# Patient Record
Sex: FEMALE | NOT HISPANIC OR LATINO | Employment: UNEMPLOYED | URBAN - NONMETROPOLITAN AREA
[De-identification: names, ages, dates, MRNs, and addresses within clinical notes are randomized per-mention and may not be internally consistent; named-entity substitution may affect disease eponyms.]

---

## 2023-03-15 LAB
APPEARANCE, URINE: CLEAR
BACTERIA, URINE: ABNORMAL /HPF
BILIRUBIN, URINE: NEGATIVE
BLOOD, URINE: ABNORMAL
COLOR, URINE: YELLOW
GLUCOSE, URINE: NEGATIVE MG/DL
KETONES, URINE: NEGATIVE MG/DL
LEUKOCYTE ESTERASE, URINE: NEGATIVE
NITRITE, URINE: NEGATIVE
PH, URINE: 6 (ref 5–8)
PROTEIN, URINE: NEGATIVE MG/DL
RBC, URINE: 2 /HPF (ref 0–5)
SPECIFIC GRAVITY, URINE: 1.01 (ref 1–1.03)
SQUAMOUS EPITHELIAL CELLS, URINE: 1 /HPF
UROBILINOGEN, URINE: <2 MG/DL (ref 0–1.9)
WBC, URINE: 1 /HPF (ref 0–5)

## 2023-03-17 LAB
APPEARANCE, URINE: CLEAR
BACTERIA, URINE: ABNORMAL /HPF
BILIRUBIN, URINE: NEGATIVE
BLOOD, URINE: ABNORMAL
COLOR, URINE: YELLOW
GLUCOSE, URINE: NEGATIVE MG/DL
KETONES, URINE: NEGATIVE MG/DL
LEUKOCYTE ESTERASE, URINE: ABNORMAL
MUCUS, URINE: ABNORMAL /LPF
NITRITE, URINE: NEGATIVE
PH, URINE: 6 (ref 5–8)
PROTEIN, URINE: NEGATIVE MG/DL
RBC, URINE: 2 /HPF (ref 0–5)
SPECIFIC GRAVITY, URINE: 1.01 (ref 1–1.03)
SQUAMOUS EPITHELIAL CELLS, URINE: 7 /HPF
URINE CULTURE: ABNORMAL
UROBILINOGEN, URINE: <2 MG/DL (ref 0–1.9)
WBC, URINE: 5 /HPF (ref 0–5)

## 2023-03-21 LAB — URINE CULTURE: ABNORMAL

## 2023-06-10 LAB
CLUE CELLS: NORMAL
NUGENT SCORE: 0
YEAST: NORMAL

## 2024-03-26 ENCOUNTER — LAB REQUISITION (OUTPATIENT)
Dept: LAB | Facility: HOSPITAL | Age: 34
End: 2024-03-26
Payer: MEDICAID

## 2024-03-26 LAB
ALBUMIN SERPL BCP-MCNC: 4.7 G/DL (ref 3.4–5)
ALP SERPL-CCNC: 70 U/L (ref 33–110)
ALT SERPL W P-5'-P-CCNC: 19 U/L (ref 7–45)
ANION GAP SERPL CALC-SCNC: 14 MMOL/L (ref 10–20)
AST SERPL W P-5'-P-CCNC: 14 U/L (ref 9–39)
BILIRUB SERPL-MCNC: 0.3 MG/DL (ref 0–1.2)
BUN SERPL-MCNC: 13 MG/DL (ref 6–23)
CALCIUM SERPL-MCNC: 10.1 MG/DL (ref 8.6–10.3)
CHLORIDE SERPL-SCNC: 106 MMOL/L (ref 98–107)
CO2 SERPL-SCNC: 26 MMOL/L (ref 21–32)
CREAT SERPL-MCNC: 0.81 MG/DL (ref 0.5–1.05)
EGFRCR SERPLBLD CKD-EPI 2021: >90 ML/MIN/1.73M*2
ERYTHROCYTE [DISTWIDTH] IN BLOOD BY AUTOMATED COUNT: 13.9 % (ref 11.5–14.5)
GLUCOSE SERPL-MCNC: 93 MG/DL (ref 74–99)
HCT VFR BLD AUTO: 40.8 % (ref 36–46)
HGB BLD-MCNC: 13 G/DL (ref 12–16)
LAMOTRIGINE SERPL-MCNC: 4.9 UG/ML (ref 2.5–15)
LITHIUM SERPL-SCNC: 0.56 MMOL/L (ref 0.6–1.2)
MCH RBC QN AUTO: 30.3 PG (ref 26–34)
MCHC RBC AUTO-ENTMCNC: 31.9 G/DL (ref 32–36)
MCV RBC AUTO: 95 FL (ref 80–100)
NRBC BLD-RTO: 0 /100 WBCS (ref 0–0)
PLATELET # BLD AUTO: 397 X10*3/UL (ref 150–450)
POTASSIUM SERPL-SCNC: 4 MMOL/L (ref 3.5–5.3)
PROT SERPL-MCNC: 7.5 G/DL (ref 6.4–8.2)
RBC # BLD AUTO: 4.29 X10*6/UL (ref 4–5.2)
SODIUM SERPL-SCNC: 142 MMOL/L (ref 136–145)
TSH SERPL-ACNC: 3.41 MIU/L (ref 0.44–3.98)
WBC # BLD AUTO: 10.9 X10*3/UL (ref 4.4–11.3)

## 2024-03-26 PROCEDURE — 80178 ASSAY OF LITHIUM: CPT | Mod: OUT,GENLAB | Performed by: PHYSICIAN ASSISTANT

## 2024-03-26 PROCEDURE — 84443 ASSAY THYROID STIM HORMONE: CPT | Mod: OUT | Performed by: PHYSICIAN ASSISTANT

## 2024-03-26 PROCEDURE — 80175 DRUG SCREEN QUAN LAMOTRIGINE: CPT | Mod: OUT,GENLAB | Performed by: PHYSICIAN ASSISTANT

## 2024-03-26 PROCEDURE — 80053 COMPREHEN METABOLIC PANEL: CPT | Mod: OUT | Performed by: PHYSICIAN ASSISTANT

## 2024-03-26 PROCEDURE — 85027 COMPLETE CBC AUTOMATED: CPT | Mod: OUT | Performed by: PHYSICIAN ASSISTANT

## 2024-06-04 ENCOUNTER — LAB REQUISITION (OUTPATIENT)
Dept: LAB | Facility: HOSPITAL | Age: 34
End: 2024-06-04
Payer: MEDICAID

## 2024-06-04 DIAGNOSIS — R41.82 ALTERED MENTAL STATUS, UNSPECIFIED: ICD-10-CM

## 2024-06-04 LAB — LITHIUM SERPL-SCNC: 0.51 MMOL/L (ref 0.6–1.2)

## 2024-06-04 PROCEDURE — 80178 ASSAY OF LITHIUM: CPT | Mod: OUT,GENLAB | Performed by: PHYSICIAN ASSISTANT

## 2024-07-03 ENCOUNTER — APPOINTMENT (OUTPATIENT)
Dept: CARDIOLOGY | Facility: HOSPITAL | Age: 34
End: 2024-07-03
Payer: MEDICAID

## 2024-07-03 ENCOUNTER — HOSPITAL ENCOUNTER (EMERGENCY)
Facility: HOSPITAL | Age: 34
Discharge: OTHER INSTITUTION | End: 2024-07-04
Attending: EMERGENCY MEDICINE
Payer: MEDICAID

## 2024-07-03 DIAGNOSIS — R45.851 SUICIDAL IDEATION: Primary | ICD-10-CM

## 2024-07-03 LAB
ALBUMIN SERPL BCP-MCNC: 4.8 G/DL (ref 3.4–5)
ALP SERPL-CCNC: 83 U/L (ref 33–110)
ALT SERPL W P-5'-P-CCNC: 25 U/L (ref 7–45)
AMPHETAMINES UR QL SCN: NORMAL
ANION GAP SERPL CALC-SCNC: 13 MMOL/L (ref 10–20)
APAP SERPL-MCNC: <10 UG/ML
AST SERPL W P-5'-P-CCNC: 18 U/L (ref 9–39)
BARBITURATES UR QL SCN: NORMAL
BASOPHILS # BLD AUTO: 0.08 X10*3/UL (ref 0–0.1)
BASOPHILS NFR BLD AUTO: 0.6 %
BENZODIAZ UR QL SCN: NORMAL
BILIRUB SERPL-MCNC: 0.4 MG/DL (ref 0–1.2)
BUN SERPL-MCNC: 13 MG/DL (ref 6–23)
BZE UR QL SCN: NORMAL
CALCIUM SERPL-MCNC: 10.4 MG/DL (ref 8.6–10.3)
CANNABINOIDS UR QL SCN: NORMAL
CHLORIDE SERPL-SCNC: 103 MMOL/L (ref 98–107)
CO2 SERPL-SCNC: 27 MMOL/L (ref 21–32)
CREAT SERPL-MCNC: 0.9 MG/DL (ref 0.5–1.05)
EGFRCR SERPLBLD CKD-EPI 2021: 87 ML/MIN/1.73M*2
EOSINOPHIL # BLD AUTO: 0.15 X10*3/UL (ref 0–0.7)
EOSINOPHIL NFR BLD AUTO: 1.2 %
ERYTHROCYTE [DISTWIDTH] IN BLOOD BY AUTOMATED COUNT: 12.7 % (ref 11.5–14.5)
ETHANOL SERPL-MCNC: <10 MG/DL
FENTANYL+NORFENTANYL UR QL SCN: NORMAL
GLUCOSE SERPL-MCNC: 95 MG/DL (ref 74–99)
HCG UR QL IA.RAPID: NEGATIVE
HCT VFR BLD AUTO: 39.4 % (ref 36–46)
HGB BLD-MCNC: 13.2 G/DL (ref 12–16)
IMM GRANULOCYTES # BLD AUTO: 0.05 X10*3/UL (ref 0–0.7)
IMM GRANULOCYTES NFR BLD AUTO: 0.4 % (ref 0–0.9)
LYMPHOCYTES # BLD AUTO: 3.26 X10*3/UL (ref 1.2–4.8)
LYMPHOCYTES NFR BLD AUTO: 25.8 %
MCH RBC QN AUTO: 30.9 PG (ref 26–34)
MCHC RBC AUTO-ENTMCNC: 33.5 G/DL (ref 32–36)
MCV RBC AUTO: 92 FL (ref 80–100)
METHADONE UR QL SCN: NORMAL
MONOCYTES # BLD AUTO: 0.74 X10*3/UL (ref 0.1–1)
MONOCYTES NFR BLD AUTO: 5.9 %
NEUTROPHILS # BLD AUTO: 8.36 X10*3/UL (ref 1.2–7.7)
NEUTROPHILS NFR BLD AUTO: 66.1 %
NRBC BLD-RTO: 0 /100 WBCS (ref 0–0)
OPIATES UR QL SCN: NORMAL
OXYCODONE+OXYMORPHONE UR QL SCN: NORMAL
PCP UR QL SCN: NORMAL
PLATELET # BLD AUTO: 369 X10*3/UL (ref 150–450)
POTASSIUM SERPL-SCNC: 3.9 MMOL/L (ref 3.5–5.3)
PROT SERPL-MCNC: 8 G/DL (ref 6.4–8.2)
RBC # BLD AUTO: 4.27 X10*6/UL (ref 4–5.2)
SALICYLATES SERPL-MCNC: <3 MG/DL
SARS-COV-2 RNA RESP QL NAA+PROBE: NOT DETECTED
SODIUM SERPL-SCNC: 139 MMOL/L (ref 136–145)
WBC # BLD AUTO: 12.6 X10*3/UL (ref 4.4–11.3)

## 2024-07-03 PROCEDURE — 2500000005 HC RX 250 GENERAL PHARMACY W/O HCPCS: Mod: SE

## 2024-07-03 PROCEDURE — 93005 ELECTROCARDIOGRAM TRACING: CPT

## 2024-07-03 PROCEDURE — 2500000001 HC RX 250 WO HCPCS SELF ADMINISTERED DRUGS (ALT 637 FOR MEDICARE OP): Mod: SE | Performed by: EMERGENCY MEDICINE

## 2024-07-03 PROCEDURE — 81001 URINALYSIS AUTO W/SCOPE: CPT | Performed by: EMERGENCY MEDICINE

## 2024-07-03 PROCEDURE — 85025 COMPLETE CBC W/AUTO DIFF WBC: CPT | Performed by: EMERGENCY MEDICINE

## 2024-07-03 PROCEDURE — 80307 DRUG TEST PRSMV CHEM ANLYZR: CPT | Performed by: EMERGENCY MEDICINE

## 2024-07-03 PROCEDURE — 99285 EMERGENCY DEPT VISIT HI MDM: CPT

## 2024-07-03 PROCEDURE — 80143 DRUG ASSAY ACETAMINOPHEN: CPT | Performed by: EMERGENCY MEDICINE

## 2024-07-03 PROCEDURE — 36415 COLL VENOUS BLD VENIPUNCTURE: CPT | Performed by: EMERGENCY MEDICINE

## 2024-07-03 PROCEDURE — 81025 URINE PREGNANCY TEST: CPT | Performed by: EMERGENCY MEDICINE

## 2024-07-03 PROCEDURE — 2500000002 HC RX 250 W HCPCS SELF ADMINISTERED DRUGS (ALT 637 FOR MEDICARE OP, ALT 636 FOR OP/ED): Mod: SE | Performed by: EMERGENCY MEDICINE

## 2024-07-03 PROCEDURE — 80053 COMPREHEN METABOLIC PANEL: CPT | Performed by: EMERGENCY MEDICINE

## 2024-07-03 PROCEDURE — 87635 SARS-COV-2 COVID-19 AMP PRB: CPT | Performed by: EMERGENCY MEDICINE

## 2024-07-03 RX ORDER — ONDANSETRON HYDROCHLORIDE 2 MG/ML
4 INJECTION, SOLUTION INTRAVENOUS ONCE
Status: DISCONTINUED | OUTPATIENT
Start: 2024-07-03 | End: 2024-07-04 | Stop reason: HOSPADM

## 2024-07-03 RX ORDER — LEVOTHYROXINE SODIUM 75 UG/1
1 TABLET ORAL DAILY
COMMUNITY

## 2024-07-03 RX ORDER — LORATADINE 10 MG/1
10 TABLET ORAL AS NEEDED
COMMUNITY
End: 2024-07-09 | Stop reason: HOSPADM

## 2024-07-03 RX ORDER — LAMOTRIGINE 100 MG/1
100 TABLET ORAL DAILY
COMMUNITY
Start: 2024-06-10 | End: 2024-07-09 | Stop reason: HOSPADM

## 2024-07-03 RX ORDER — LAMOTRIGINE 100 MG/1
100 TABLET ORAL DAILY
Status: DISCONTINUED | OUTPATIENT
Start: 2024-07-03 | End: 2024-07-04 | Stop reason: HOSPADM

## 2024-07-03 RX ORDER — ONDANSETRON 4 MG/1
4 TABLET, ORALLY DISINTEGRATING ORAL ONCE
Status: COMPLETED | OUTPATIENT
Start: 2024-07-03 | End: 2024-07-03

## 2024-07-03 RX ORDER — LITHIUM CARBONATE 300 MG
300 TABLET ORAL 2 TIMES DAILY
Status: DISCONTINUED | OUTPATIENT
Start: 2024-07-03 | End: 2024-07-04 | Stop reason: HOSPADM

## 2024-07-03 RX ORDER — HYDROXYZINE PAMOATE 50 MG/1
50 CAPSULE ORAL EVERY 6 HOURS PRN
COMMUNITY
End: 2024-07-09 | Stop reason: HOSPADM

## 2024-07-03 RX ORDER — ACETAMINOPHEN 325 MG/1
650 TABLET ORAL EVERY 4 HOURS PRN
COMMUNITY
Start: 2021-02-05 | End: 2024-07-09 | Stop reason: HOSPADM

## 2024-07-03 RX ORDER — RISPERIDONE 1 MG/1
4 TABLET ORAL NIGHTLY
Status: DISCONTINUED | OUTPATIENT
Start: 2024-07-03 | End: 2024-07-04 | Stop reason: HOSPADM

## 2024-07-03 RX ORDER — ONDANSETRON 4 MG/1
TABLET, ORALLY DISINTEGRATING ORAL
Status: COMPLETED
Start: 2024-07-03 | End: 2024-07-03

## 2024-07-03 RX ORDER — PROPRANOLOL HYDROCHLORIDE 20 MG/1
20 TABLET ORAL DAILY
COMMUNITY

## 2024-07-03 RX ORDER — RISPERIDONE 4 MG/1
4 TABLET ORAL NIGHTLY
COMMUNITY
End: 2024-07-09 | Stop reason: HOSPADM

## 2024-07-03 RX ORDER — BISMUTH SUBSALICYLATE 525 MG/30ML
15 LIQUID ORAL EVERY 6 HOURS PRN
COMMUNITY
End: 2024-07-09 | Stop reason: HOSPADM

## 2024-07-03 RX ORDER — LAMOTRIGINE 200 MG/1
200 TABLET ORAL DAILY
COMMUNITY
End: 2024-07-09 | Stop reason: HOSPADM

## 2024-07-03 RX ORDER — LITHIUM CARBONATE 300 MG/1
300 CAPSULE ORAL
COMMUNITY
Start: 2024-06-10 | End: 2024-07-09 | Stop reason: HOSPADM

## 2024-07-03 RX ORDER — ESCITALOPRAM OXALATE 10 MG/1
10 TABLET, FILM COATED ORAL DAILY
COMMUNITY
End: 2024-07-09 | Stop reason: HOSPADM

## 2024-07-03 RX ORDER — OMEPRAZOLE 40 MG/1
40 CAPSULE, DELAYED RELEASE ORAL
COMMUNITY

## 2024-07-03 RX ORDER — TITANIUM DIOXIDE, OCTINOXATE, ZINC OXIDE 4.61; 1.6; .78 G/40ML; G/40ML; G/40ML
1 CREAM TOPICAL
COMMUNITY
End: 2024-07-09 | Stop reason: HOSPADM

## 2024-07-03 SDOH — HEALTH STABILITY: MENTAL HEALTH

## 2024-07-03 SDOH — HEALTH STABILITY: MENTAL HEALTH: BEHAVIORS/MOOD: ANXIOUS;COOPERATIVE

## 2024-07-03 SDOH — HEALTH STABILITY: MENTAL HEALTH: BEHAVIORS/MOOD: CALM;COOPERATIVE

## 2024-07-03 SDOH — SOCIAL STABILITY: SOCIAL NETWORK: VISITOR BEHAVIORS: APPROPRIATE FOR SITUATION;COOPERATIVE;SUPPORTIVE;CALM

## 2024-07-03 ASSESSMENT — COLUMBIA-SUICIDE SEVERITY RATING SCALE - C-SSRS
5. HAVE YOU STARTED TO WORK OUT OR WORKED OUT THE DETAILS OF HOW TO KILL YOURSELF? DO YOU INTEND TO CARRY OUT THIS PLAN?: YES
6. HAVE YOU EVER DONE ANYTHING, STARTED TO DO ANYTHING, OR PREPARED TO DO ANYTHING TO END YOUR LIFE?: YES
6. HAVE YOU EVER DONE ANYTHING, STARTED TO DO ANYTHING, OR PREPARED TO DO ANYTHING TO END YOUR LIFE?: YES
5. HAVE YOU STARTED TO WORK OUT OR WORKED OUT THE DETAILS OF HOW TO KILL YOURSELF? DO YOU INTEND TO CARRY OUT THIS PLAN?: YES
4. HAVE YOU HAD THESE THOUGHTS AND HAD SOME INTENTION OF ACTING ON THEM?: YES
1. SINCE LAST CONTACT, HAVE YOU WISHED YOU WERE DEAD OR WISHED YOU COULD GO TO SLEEP AND NOT WAKE UP?: YES
2. HAVE YOU ACTUALLY HAD ANY THOUGHTS OF KILLING YOURSELF?: YES
1. IN THE PAST MONTH, HAVE YOU WISHED YOU WERE DEAD OR WISHED YOU COULD GO TO SLEEP AND NOT WAKE UP?: YES
6. HAVE YOU EVER DONE ANYTHING, STARTED TO DO ANYTHING, OR PREPARED TO DO ANYTHING TO END YOUR LIFE?: NO
2. HAVE YOU ACTUALLY HAD ANY THOUGHTS OF KILLING YOURSELF?: YES

## 2024-07-03 ASSESSMENT — PAIN SCALES - GENERAL: PAINLEVEL_OUTOF10: 0 - NO PAIN

## 2024-07-03 ASSESSMENT — PAIN - FUNCTIONAL ASSESSMENT: PAIN_FUNCTIONAL_ASSESSMENT: 0-10

## 2024-07-03 NOTE — ED TRIAGE NOTES
Pt to ED for suicidal ideation, she states her plan is to get a knife and slice her arm. Pt was sent by her therapist to ED, she was at the therapist today. Pt's speech is very pressured and she states she does have a history of attempts.
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
## 2024-07-04 ENCOUNTER — HOSPITAL ENCOUNTER (INPATIENT)
Facility: HOSPITAL | Age: 34
End: 2024-07-04
Attending: PSYCHIATRY & NEUROLOGY | Admitting: PSYCHIATRY & NEUROLOGY
Payer: MEDICAID

## 2024-07-04 VITALS
OXYGEN SATURATION: 98 % | BODY MASS INDEX: 28.53 KG/M2 | DIASTOLIC BLOOD PRESSURE: 72 MMHG | RESPIRATION RATE: 16 BRPM | HEART RATE: 88 BPM | WEIGHT: 151.13 LBS | TEMPERATURE: 98.2 F | HEIGHT: 61 IN | SYSTOLIC BLOOD PRESSURE: 131 MMHG

## 2024-07-04 DIAGNOSIS — F31.9 BIPOLAR DEPRESSION (MULTI): Primary | ICD-10-CM

## 2024-07-04 DIAGNOSIS — L85.3 DRY SKIN: ICD-10-CM

## 2024-07-04 DIAGNOSIS — G40.909 SEIZURE DISORDER (MULTI): ICD-10-CM

## 2024-07-04 LAB
APPEARANCE UR: CLEAR
BACTERIA #/AREA URNS AUTO: ABNORMAL /HPF
BILIRUB UR STRIP.AUTO-MCNC: NEGATIVE MG/DL
COLOR UR: COLORLESS
GLUCOSE UR STRIP.AUTO-MCNC: NORMAL MG/DL
KETONES UR STRIP.AUTO-MCNC: NEGATIVE MG/DL
LEUKOCYTE ESTERASE UR QL STRIP.AUTO: NEGATIVE
NITRITE UR QL STRIP.AUTO: NEGATIVE
PH UR STRIP.AUTO: 7 [PH]
PROT UR STRIP.AUTO-MCNC: NEGATIVE MG/DL
RBC # UR STRIP.AUTO: ABNORMAL /UL
RBC #/AREA URNS AUTO: ABNORMAL /HPF
SP GR UR STRIP.AUTO: 1
SQUAMOUS #/AREA URNS AUTO: ABNORMAL /HPF
UROBILINOGEN UR STRIP.AUTO-MCNC: NORMAL MG/DL
WBC #/AREA URNS AUTO: ABNORMAL /HPF

## 2024-07-04 PROCEDURE — 97165 OT EVAL LOW COMPLEX 30 MIN: CPT | Mod: GO

## 2024-07-04 PROCEDURE — 2500000001 HC RX 250 WO HCPCS SELF ADMINISTERED DRUGS (ALT 637 FOR MEDICARE OP): Mod: SE | Performed by: EMERGENCY MEDICINE

## 2024-07-04 PROCEDURE — 1140000001 HC PRIVATE PSYCH ROOM DAILY

## 2024-07-04 PROCEDURE — 2500000001 HC RX 250 WO HCPCS SELF ADMINISTERED DRUGS (ALT 637 FOR MEDICARE OP): Mod: SE | Performed by: FAMILY MEDICINE

## 2024-07-04 RX ORDER — LEVOTHYROXINE SODIUM 75 UG/1
75 TABLET ORAL DAILY
Status: DISCONTINUED | OUTPATIENT
Start: 2024-07-04 | End: 2024-07-04 | Stop reason: HOSPADM

## 2024-07-04 RX ORDER — ESCITALOPRAM OXALATE 10 MG/1
10 TABLET ORAL DAILY
Status: DISCONTINUED | OUTPATIENT
Start: 2024-07-04 | End: 2024-07-04 | Stop reason: HOSPADM

## 2024-07-04 RX ORDER — LAMOTRIGINE 100 MG/1
200 TABLET ORAL DAILY
Status: DISCONTINUED | OUTPATIENT
Start: 2024-07-04 | End: 2024-07-04 | Stop reason: HOSPADM

## 2024-07-04 RX ORDER — IBUPROFEN 600 MG/1
600 TABLET ORAL EVERY 6 HOURS PRN
Status: DISCONTINUED | OUTPATIENT
Start: 2024-07-04 | End: 2024-07-09 | Stop reason: HOSPADM

## 2024-07-04 RX ORDER — ACETAMINOPHEN 325 MG/1
TABLET ORAL
Status: COMPLETED
Start: 2024-07-04 | End: 2024-07-04

## 2024-07-04 RX ORDER — TRAZODONE HYDROCHLORIDE 50 MG/1
50 TABLET ORAL NIGHTLY PRN
Status: DISCONTINUED | OUTPATIENT
Start: 2024-07-04 | End: 2024-07-09 | Stop reason: HOSPADM

## 2024-07-04 RX ORDER — PROPRANOLOL HYDROCHLORIDE 10 MG/1
20 TABLET ORAL ONCE
Status: COMPLETED | OUTPATIENT
Start: 2024-07-04 | End: 2024-07-04

## 2024-07-04 RX ORDER — ALUMINUM HYDROXIDE, MAGNESIUM HYDROXIDE, AND SIMETHICONE 1200; 120; 1200 MG/30ML; MG/30ML; MG/30ML
10 SUSPENSION ORAL 4 TIMES DAILY PRN
Status: DISCONTINUED | OUTPATIENT
Start: 2024-07-04 | End: 2024-07-09 | Stop reason: HOSPADM

## 2024-07-04 RX ORDER — LAMOTRIGINE 100 MG/1
100 TABLET ORAL 2 TIMES DAILY
Status: DISCONTINUED | OUTPATIENT
Start: 2024-07-05 | End: 2024-07-09 | Stop reason: HOSPADM

## 2024-07-04 RX ORDER — HYDROXYZINE HYDROCHLORIDE 25 MG/1
50 TABLET, FILM COATED ORAL EVERY 6 HOURS PRN
Status: DISCONTINUED | OUTPATIENT
Start: 2024-07-04 | End: 2024-07-09 | Stop reason: HOSPADM

## 2024-07-04 RX ORDER — PANTOPRAZOLE SODIUM 40 MG/1
40 TABLET, DELAYED RELEASE ORAL
Status: DISCONTINUED | OUTPATIENT
Start: 2024-07-05 | End: 2024-07-04 | Stop reason: HOSPADM

## 2024-07-04 RX ORDER — OLANZAPINE 5 MG/1
5 TABLET ORAL EVERY 6 HOURS PRN
Status: DISCONTINUED | OUTPATIENT
Start: 2024-07-04 | End: 2024-07-09 | Stop reason: HOSPADM

## 2024-07-04 RX ORDER — ACETAMINOPHEN 325 MG/1
650 TABLET ORAL ONCE
Status: COMPLETED | OUTPATIENT
Start: 2024-07-04 | End: 2024-07-04

## 2024-07-04 RX ORDER — ACETAMINOPHEN 325 MG/1
650 TABLET ORAL EVERY 6 HOURS PRN
Status: DISCONTINUED | OUTPATIENT
Start: 2024-07-04 | End: 2024-07-09 | Stop reason: HOSPADM

## 2024-07-04 RX ORDER — OLANZAPINE 10 MG/2ML
5 INJECTION, POWDER, FOR SOLUTION INTRAMUSCULAR EVERY 6 HOURS PRN
Status: DISCONTINUED | OUTPATIENT
Start: 2024-07-04 | End: 2024-07-09 | Stop reason: HOSPADM

## 2024-07-04 SDOH — HEALTH STABILITY: MENTAL HEALTH: HAVE YOU ACTUALLY HAD ANY THOUGHTS OF KILLING YOURSELF?: YES

## 2024-07-04 SDOH — HEALTH STABILITY: MENTAL HEALTH
HAVE YOU STARTED TO WORK OUT OR WORKED OUT THE DETAILS OF HOW TO KILL YOURSELF? DO YOU INTENT TO CARRY OUT THIS PLAN?: YES

## 2024-07-04 SDOH — SOCIAL STABILITY: SOCIAL INSECURITY: HAS ANYONE EVER THREATENED TO HURT YOUR FAMILY OR YOUR PETS?: NO

## 2024-07-04 SDOH — HEALTH STABILITY: MENTAL HEALTH: SUICIDAL BEHAVIOR (3 MONTHS): NO

## 2024-07-04 SDOH — HEALTH STABILITY: MENTAL HEALTH: HOW DID YOU TRY TO KILL YOURSELF?: SA VIA CUTTING

## 2024-07-04 SDOH — HEALTH STABILITY: MENTAL HEALTH: HAVE YOU WISHED YOU WERE DEAD OR WISHED YOU COULD GO TO SLEEP AND NOT WAKE UP?: YES

## 2024-07-04 SDOH — SOCIAL STABILITY: SOCIAL INSECURITY: HAVE YOU HAD THOUGHTS OF HARMING ANYONE ELSE?: NO

## 2024-07-04 SDOH — SOCIAL STABILITY: SOCIAL INSECURITY: HAVE YOU HAD ANY THOUGHTS OF HARMING ANYONE ELSE?: NO

## 2024-07-04 SDOH — HEALTH STABILITY: MENTAL HEALTH: HAVE YOU EVER TRIED TO KILL YOURSELF?: YES

## 2024-07-04 SDOH — HEALTH STABILITY: MENTAL HEALTH: IN THE PAST FEW WEEKS, HAVE YOU FELT THAT YOU OR YOUR FAMILY WOULD BE BETTER OFF IF YOU WERE DEAD?: YES

## 2024-07-04 SDOH — SOCIAL STABILITY: SOCIAL INSECURITY: ABUSE: ADULT

## 2024-07-04 SDOH — HEALTH STABILITY: MENTAL HEALTH: ANXIETY SYMPTOMS: NO PROBLEMS REPORTED OR OBSERVED.

## 2024-07-04 SDOH — HEALTH STABILITY: MENTAL HEALTH: HAVE YOU EVER DONE ANYTHING, STARTED TO DO ANYTHING, OR PREPARED TO DO ANYTHING TO END YOUR LIFE?: YES

## 2024-07-04 SDOH — HEALTH STABILITY: MENTAL HEALTH: DEPRESSION SYMPTOMS: APPETITE CHANGE;CHANGE IN ENERGY LEVEL;FEELINGS OF HOPELESSESS;ISOLATIVE;LOSS OF INTEREST

## 2024-07-04 SDOH — HEALTH STABILITY: MENTAL HEALTH: ACTIVE SUICIDAL IDEATION WITH SOME INTENT TO ACT, WITHOUT SPECIFIC PLAN (PAST 1 MONTH): YES

## 2024-07-04 SDOH — HEALTH STABILITY: MENTAL HEALTH: HAVE YOU HAD THESE THOUGHTS AND HAD SOME INTENTION OF ACTING ON THEM?: YES

## 2024-07-04 SDOH — HEALTH STABILITY: MENTAL HEALTH: WISH TO BE DEAD (PAST 1 MONTH): YES

## 2024-07-04 SDOH — HEALTH STABILITY: MENTAL HEALTH: BEHAVIORS/MOOD: APPROPRIATE FOR SITUATION

## 2024-07-04 SDOH — HEALTH STABILITY: MENTAL HEALTH: IN THE PAST FEW WEEKS, HAVE YOU WISHED YOU WERE DEAD?: YES

## 2024-07-04 SDOH — SOCIAL STABILITY: SOCIAL INSECURITY: WERE YOU ABLE TO COMPLETE ALL THE BEHAVIORAL HEALTH SCREENINGS?: YES

## 2024-07-04 SDOH — SOCIAL STABILITY: SOCIAL INSECURITY: DOES ANYONE TRY TO KEEP YOU FROM HAVING/CONTACTING OTHER FRIENDS OR DOING THINGS OUTSIDE YOUR HOME?: NO

## 2024-07-04 SDOH — SOCIAL STABILITY: SOCIAL INSECURITY: DO YOU FEEL ANYONE HAS EXPLOITED OR TAKEN ADVANTAGE OF YOU FINANCIALLY OR OF YOUR PERSONAL PROPERTY?: NO

## 2024-07-04 SDOH — HEALTH STABILITY: MENTAL HEALTH: WAS THIS WITHIN THE PAST THREE MONTHS?: YES

## 2024-07-04 SDOH — SOCIAL STABILITY: SOCIAL INSECURITY: DO YOU FEEL UNSAFE GOING BACK TO THE PLACE WHERE YOU ARE LIVING?: NO

## 2024-07-04 SDOH — HEALTH STABILITY: MENTAL HEALTH: NON-SPECIFIC ACTIVE SUICIDAL THOUGHTS (PAST 1 MONTH): YES

## 2024-07-04 SDOH — HEALTH STABILITY: MENTAL HEALTH: RISK OF SUICIDE: HIGH RISK

## 2024-07-04 SDOH — SOCIAL STABILITY: SOCIAL INSECURITY: ARE YOU OR HAVE YOU BEEN THREATENED OR ABUSED PHYSICALLY, EMOTIONALLY, OR SEXUALLY BY ANYONE?: NO

## 2024-07-04 SDOH — HEALTH STABILITY: MENTAL HEALTH: SLEEP PATTERN: UNABLE TO ASSESS

## 2024-07-04 SDOH — HEALTH STABILITY: MENTAL HEALTH: DELUSIONS: OTHER (COMMENT)

## 2024-07-04 SDOH — HEALTH STABILITY: MENTAL HEALTH: IN THE PAST WEEK, HAVE YOU BEEN HAVING THOUGHTS ABOUT KILLING YOURSELF?: YES

## 2024-07-04 SDOH — SOCIAL STABILITY: SOCIAL NETWORK: VISITOR BEHAVIORS: APPROPRIATE FOR SITUATION

## 2024-07-04 SDOH — HEALTH STABILITY: MENTAL HEALTH: ACTIVE SUICIDAL IDEATION WITH SPECIFIC PLAN AND INTENT (PAST 1 MONTH): YES

## 2024-07-04 SDOH — HEALTH STABILITY: MENTAL HEALTH: CONTENT: UNREMARKABLE

## 2024-07-04 SDOH — HEALTH STABILITY: MENTAL HEALTH: HAVE YOU BEEN THINKING ABOUT HOW YOU MIGHT DO THIS?: YES

## 2024-07-04 SDOH — ECONOMIC STABILITY: HOUSING INSECURITY: FEELS SAFE LIVING IN HOME: YES

## 2024-07-04 SDOH — HEALTH STABILITY: MENTAL HEALTH: NEEDS EXPRESSED: DENIES

## 2024-07-04 SDOH — HEALTH STABILITY: MENTAL HEALTH: ARE YOU HAVING THOUGHTS OF KILLING YOURSELF RIGHT NOW?: YES

## 2024-07-04 SDOH — HEALTH STABILITY: MENTAL HEALTH: SUICIDAL BEHAVIOR (LIFETIME): YES

## 2024-07-04 SDOH — SOCIAL STABILITY: SOCIAL INSECURITY: ARE THERE ANY APPARENT SIGNS OF INJURIES/BEHAVIORS THAT COULD BE RELATED TO ABUSE/NEGLECT?: NO

## 2024-07-04 ASSESSMENT — LIFESTYLE VARIABLES
HOW OFTEN DO YOU HAVE 6 OR MORE DRINKS ON ONE OCCASION: NEVER
TOTAL_SCORE: 0
HOW MANY STANDARD DRINKS CONTAINING ALCOHOL DO YOU HAVE ON A TYPICAL DAY: PATIENT DOES NOT DRINK
AUDIT-C TOTAL SCORE: 0
VISUAL DISTURBANCES: NOT PRESENT
ANXIETY: NO ANXIETY, AT EASE
PRESCIPTION_ABUSE_PAST_12_MONTHS: NO
PAROXYSMAL SWEATS: NO SWEAT VISIBLE
SUBSTANCE_ABUSE_PAST_12_MONTHS: NO
SKIP TO QUESTIONS 9-10: 1
NAUSEA AND VOMITING: NO NAUSEA AND NO VOMITING
TREMOR: NO TREMOR
AGITATION: NORMAL ACTIVITY
ORIENTATION AND CLOUDING OF SENSORIUM: ORIENTED AND CAN DO SERIAL ADDITIONS
AUDITORY DISTURBANCES: NOT PRESENT
HOW OFTEN DO YOU HAVE A DRINK CONTAINING ALCOHOL: NEVER
PRESCIPTION_ABUSE_PAST_12_MONTHS: NO
AUDIT-C TOTAL SCORE: 0
CIWA TOTAL SCORE: 0
SUBSTANCE_ABUSE_PAST_12_MONTHS: NO
HEADACHE, FULLNESS IN HEAD: NOT PRESENT

## 2024-07-04 ASSESSMENT — PAIN SCALES - GENERAL
PAINLEVEL_OUTOF10: 5 - MODERATE PAIN
PAINLEVEL_OUTOF10: 0 - NO PAIN
PAINLEVEL_OUTOF10: 0 - NO PAIN
PAINLEVEL_OUTOF10: 5 - MODERATE PAIN

## 2024-07-04 ASSESSMENT — ACTIVITIES OF DAILY LIVING (ADL)
LACK_OF_TRANSPORTATION: NO
ADEQUATE_TO_COMPLETE_ADL: YES
DRESSING YOURSELF: INDEPENDENT
HEARING - LEFT EAR: FUNCTIONAL
HEARING - RIGHT EAR: FUNCTIONAL
PATIENT'S MEMORY ADEQUATE TO SAFELY COMPLETE DAILY ACTIVITIES?: YES
BATHING: NEEDS ASSISTANCE
JUDGMENT_ADEQUATE_SAFELY_COMPLETE_DAILY_ACTIVITIES: YES
FEEDING YOURSELF: INDEPENDENT
TOILETING: INDEPENDENT
GROOMING: INDEPENDENT
WALKS IN HOME: INDEPENDENT

## 2024-07-04 ASSESSMENT — COLUMBIA-SUICIDE SEVERITY RATING SCALE - C-SSRS
6. HAVE YOU EVER DONE ANYTHING, STARTED TO DO ANYTHING, OR PREPARED TO DO ANYTHING TO END YOUR LIFE?: NO
5. HAVE YOU STARTED TO WORK OUT OR WORKED OUT THE DETAILS OF HOW TO KILL YOURSELF? DO YOU INTEND TO CARRY OUT THIS PLAN?: YES
1. SINCE LAST CONTACT, HAVE YOU WISHED YOU WERE DEAD OR WISHED YOU COULD GO TO SLEEP AND NOT WAKE UP?: YES
2. HAVE YOU ACTUALLY HAD ANY THOUGHTS OF KILLING YOURSELF?: YES

## 2024-07-04 ASSESSMENT — PATIENT HEALTH QUESTIONNAIRE - PHQ9
1. LITTLE INTEREST OR PLEASURE IN DOING THINGS: SEVERAL DAYS
2. FEELING DOWN, DEPRESSED OR HOPELESS: SEVERAL DAYS
SUM OF ALL RESPONSES TO PHQ9 QUESTIONS 1 & 2: 2

## 2024-07-04 ASSESSMENT — PAIN - FUNCTIONAL ASSESSMENT: PAIN_FUNCTIONAL_ASSESSMENT: 0-10

## 2024-07-04 ASSESSMENT — PAIN DESCRIPTION - LOCATION: LOCATION: HEAD

## 2024-07-04 NOTE — NURSING NOTE
".ADMISSION NOTE:  Date: 7/4/24  Time: 1305  [place] [facility]       Upon arrival to unit, the patient’s personal belongings were gathered, inventoried, and laundered; vitals and weight were obtained; patient was wanded for security purposes, oriented to the unit and the admission process was initiated.   Patient resides at a group home.  Patient states that she is suicidal \"all the time.\"  1:1 sitter initiated.    Allergies:  Iodine  "

## 2024-07-04 NOTE — CARE PLAN
"RN in to assess patient, patient currently expresses SI related to cutting wrists. Patient denies any access to lethal weapons or knives (locked up in group home). Patient remains on a 1:1 observation at this time. Patient hard to understand at times, was told that she is told often to slow down her speaking and \"open her jaw\" to pronounce words. Patient cooperative on the unit, friendly with staff and peers. Patient told to report to staff any immediate thoughts of intent to harm self while on the unit.     The patient's goals for the shift include Attend groups    The clinical goals for the shift include Orientation to unit    Problem: Sensory Perceptual Alteration as Evidenced by  Goal: Cooperates with admission process  Outcome: Met     Problem: Ineffective Coping  Goal: LTG - Verbalizes alternatives to suicide  Outcome: Progressing  Goal: STG - Verbalizes control of suicidal thoughts/behaviors  Outcome: Progressing     Problem: Fall/Injury  Goal: Not fall by end of shift  Outcome: Met  Goal: Be free from injury by end of the shift  Outcome: Met       "

## 2024-07-04 NOTE — PROGRESS NOTES
"Occupational Therapy    OT Behavioral Health Evaluation    Patient Name: Gissel Greene  MRN: [id number]  Today's Date: 7/4/2024   Time in: 1411  Time out: 1423    OT Intervention Plan:  Skilled OT interventions to include: therapeutic use of self, therapeutic use of occupations and activities, therapeutic groups (including task skill groups, life skill groups, coping skill groups, anger/grief management groups, and ADL/IADL groups), and 1:1 sessions focused on individualized goals for mental health management to improve ADL/IADL performance.      Subjective   Current Problem:  No diagnosis found.  General:  General  Reason for Referral: ADL impairment  Referred By: Dr. Flor  Past Medical History Relevant to Rehab: developmental disability, bipolar disorder, depression, ADHD, previous psychiatric admissions  Prior to Session Communication: Bedside nurse  Patient Position Received: Up in chair  General Comment: 34 y/o female sent by therapist after endorsing SI w/ plan to cut self on arm. Pt states, \"I need some more coping skills.\" Pt has history of depression & previous suicide attempts. When asked about any triggers, pt mentions something about her dad. In addition to acknowledging that she needs more coping skills, pt feels her current med combination is negatively impacting her & wants to change meds. Pt remains on 1:1 supervision due to suicide risk. Denies HI & current A/V hallucinations, though pt has history of auditory hallucinations.    Precautions: 1:1    Meaningful Occupations:  Friend, daughter, girlfriend, employee, community member. Enjoys music & painting.     Sources of Support:    staff at facility where she resides, some family, some friends    Prior Level of Function/Living Situation:    Activities of Daily Living/Self Care  Living Situation: independent living facility - assisted living  Hygiene/Grooming: " independence  Bathing: independence  Dressing: independence  Toileting: independence  Continence: independence  Feeding: independence  Functional Mobility: independence  Medication Use/Follows Medical Advice: compliant  ADL Comment:      Instrumental Activities of Daily Living  Parenting/: impaired - Pt states she has an 9 y/o daughter who lives out of state w/ her other family. Pt does not see her often & expresses sadness at this.  Driving/Community Mobility: WFL  Financial Management: WFL  Physical Self-care : WFL  Emotional Self-care: impaired  Home Care/Chores: WFL  Cooking: N/a   Safety Judgement: impaired  Rest/Sleep: impaired  Education:   unknown   Work/Volunteering:   part time  IADL/Daily Routine Comment:       Social Participation/Community Involvement:  Socializing: Pt states she has a boyfriend & socializes w/ coworkers at work.  Balanced Relationships:  Pt briefly references having a boyfriend.     Emotional Regulation Skills:  Emotional Expression:  Affect: constricted  Speech: slurring/difficult to understand - baseline for pt  Mood/Impulse Modulation: poor anxiety management, poor depression management, and poor grief management  Coping Skills:  Helpful: creative outlets  Harmful: self-harm and suicidal or homicidal ideation    Cognitive & Task Performance Skills:  Orientation: person, place, and situation  Attention Span: selective  Problem-solving: impaired  Decision-making: impaired  Thought Content: suicidal ideation  Thought Processes: coherent  Organizational Skills: thought processes are linear and organized  Short Term Memory: WFL  Remote Memory: WFL  Safety Judgement: impaired  Perseveration:  not present  Insight/Judgement:  impaired    Communication and Interpersonal Skills:  Boundaries: impaired  Appropriate Disclosure: WFL  Assertion: impaired   Communication/Interpersonal Comment: Pt could benefit from assertive communication training.      Goals:   Encounter Problems        Encounter Problems (Active)       OT Goals       Pt will explore, identify, and appropriately utilize effective coping strategies to cope with daily stressors and manage emotions with independence prior to discharge.        Start:  07/04/24    Expected End:  08/01/24            Pt will demonstrate ability to appropriately modulate emotions and impulses with independence prior to discharge.        Start:  07/04/24    Expected End:  08/01/24            Pt will explore, identify, and appropriately utilize effective communication strategies to express feelings, wants, and needs with independence prior to discharge.        Start:  07/04/24    Expected End:  08/01/24                 Education:  Pt provided overview of stay on inpatient psychiatric unit, including general expectations, occupational therapy's role, and interdisciplinary approach to care. Pt verbalized understanding.

## 2024-07-04 NOTE — SIGNIFICANT EVENT
Application for Emergency Admission      Ready for Transfer?  Is the patient medically cleared for transfer to inpatient psychiatry: Yes  Has the patient been accepted to an inpatient psychiatric hospital: Yes    Application for Emergency Admission  IN ACCORDANCE WITH [place]  The Chief Clinical Officer of: Dr. Basia Barr. 7/4/2024 .3:40 AM    Reason for Hospitalization  The undersigned has reason to believe that: Gissel Greene Is a mentally ill person subject to hospitalization by court order under division B Section 5122.01 of [place] Revised Code, i.e., this person:    1.Yes  Represents a substantial risk of physical harm to self as manifested by evidence of threats of, or attempts at, suicide or serious self-inflicted bodily harm    2.Yes Represents a substantial risk of physical harm to others as manifested by evidence of recent homicidal or other violent behavior, evidence of recent threats that place another in reasonable fear of violent behavior and serious physical harm, or other evidence of present dangerousness    3.Yes Represents a substantial and immediate risk of serious physical impairment or injury to self as manifested by  evidence that the person is unable to provide for and is not providing for the person's basic physical needs because of the person's mental illness and that appropriate provision for those needs cannot be made  immediately available in the community    4.Yes Would benefit from treatment in a hospital for his mental illness and is in need of such treatment as manifested by evidence of behavior that creates a grave and imminent risk to substantial rights of others or  himself.    5.Yes Would benefit from treatment as manifested by evidence of behavior that indicates all of the following:       (a) The person is unlikely to survive safely in the community without supervision, based on a clinical determination.       (b) The person has a history of lack of compliance  with treatment for mental illness and one of the following applies:      (i) At least twice within the thirty-six months prior to the filing of an affidavit seeking court-ordered treatment of the person under section 5122.111 of the Revised Code, the lack of compliance has been a significant factor in necessitating hospitalization in a hospital or receipt of services in a forensic or other mental health unit of a correctional facility, provided that the thirty-six-month period shall be extended by the length of any hospitalization or incarceration of the person that occurred within the thirty-six-month period.      (ii) Within the forty-eight months prior to the filing of an affidavit seeking court-ordered treatment of the person under section 5122.111 of the Revised Code, the lack of compliance resulted in one or more acts of serious violent behavior toward self or others or threats of, or attempts at, serious physical harm to self or others, provided that the forty-eight-month period shall be extended by the length of any hospitalization or incarceration of the person that occurred within the forty-eight-month period.      (c) The person, as a result of mental illness, is unlikely to voluntarily participate in necessary treatment.       (d) In view of the person's treatment history and current behavior, the person is in need of treatment in order to prevent a relapse or deterioration that would be likely to result in substantial risk of serious harm to the person or others.    (e) Represents a substantial risk of physical harm to self or others if allowed to remain at liberty pending examination.    Therefore, it is requested that said person be admitted to the above named facility.    STATEMENT OF BELIEF    Must be filled out by one of the following: a psychiatrist, licensed physician, licensed clinical psychologist, health or ,  or .  (Statement shall include the circumstances  under which the individual was taken into custody and the reason for the person's belief that hospitalization is necessary. The statement shall also include a reference to efforts made to secure the individual's property at his residence if he was taken into custody there. Every reasonable and appropriate effort should be made to take this person into custody in the least conspicuous manner possible.)         King Bartlett MD 7/4/2024     _____________________________________________________________   Place of Employment: [employer] [facility] [employer]    STATEMENT OF OBSERVATION BY PSYCHIATRIST, LICENSED PHYSICIAN, OR LICENSED CLINICAL PSYCHOLOGIST, IF APPLICABLE    Place of Observation (e.g., [facility] mental health center, general hospital, office, emergency facility)  (If applicable, please complete)    King Bartlett MD 7/4/2024    _____________________________________________________________

## 2024-07-04 NOTE — PROGRESS NOTES
Emergency Medicine Transition of Care Note.    I received Gissel Greene in signout from  Quentin Ochoa Please see the previous ED provider note for all HPI, PE and MDM up to the time of signout at 2130. This is in addition to the primary record.    In brief Gissel Greene is an 33 y.o. female presenting for   Chief Complaint   Patient presents with    Suicidal     Pt to ED for suicidal ideation, she states her plan is to get a knife and slice her arm. Pt was sent by her therapist to ED, she was at the therapist today. Pt's speech is very pressured and she states she does have a history of attempts.      At the time of signout we were awaiting: EPAT    ED Course as of 07/04/24 0037   Wed Jul 03, 2024   1702 EKG obtained at 1700, interpreted by myself.  Normal sinus rhythm with a ventricular rate of 69, no axis deviation, normal intervals, with no acute ischemic changes [VT]      ED Course User Index  [VT] Maye HODGE MD       Medical Decision Making  Patient was signed out to me in change of shift by my midlevel.  EPAT did speak with the patient and they feel that she is far from her baseline since she is endorsing suicidal thoughts and plans.  The plan to place this patient in an inpatient facility for stabilization.        Final diagnoses:   None           Procedure  Procedures    King Bartlett MD    (2) Patient Placed in Bed

## 2024-07-04 NOTE — PROGRESS NOTES
EPAT - Social Work Psychiatric Assessment    Arrival Details  Mode of Arrival: Ambulance  Admission Source: Home  Admission Type: Voluntary  EPAT Assessment Start Date: 07/04/24  EPAT Assessment Start Time: 2230  Name of : Anni Marcos. LPC    History of Present Illness  Admission Reason: SI  HPI: Pt is 33 years old  female who presented to the ED for SI. Pt has a history of bipolar disorder, developmental disability, depression, and ADHD. Pt has a history of inpatient psychiatric admissions, with the most recent one at [facility] 06/2020 for SI. Pt is seeing MH provider and a therapist. Pt is compliant with medications. Pt’s chart, ED provider, and nursing notes were reviewed prior to the assessment. Pt was brought to the ED after an appointment with her therapist, where she reported SI with a plan “to get a knife and slice her arm.” Pt’s BAL and UDS were negative.    SW Readmission Information   Readmission within 30 Days: No    Psychiatric Symptoms  Anxiety Symptoms: No problems reported or observed.  Depression Symptoms: Appetite change, Change in energy level, Feelings of hopelessess, Isolative, Loss of interest  Genna Symptoms: No problems reported or observed.    Psychosis Symptoms  Hallucination Type: Auditory, Command  Delusion Type: No problems reported or observed.    Additional Symptoms - Adult  Generalized Anxiety Disorder: No problems reported or observed.  Obsessive Compulsive Disorder: No problems reported or observed.  Panic Attack: No problems reported or observed.  Post Traumatic Stress Disorder: No problems reported or observed.  Delirium: No problems reported or observed.    Past Psychiatric History/Meds/Treatments  Past Psychiatric History: bipolar disorder, developmental disability, depression, and ADHD  Past Psychiatric Meds/Treatments: [facility] 06/2020, 2019 and 2018  Past Violence/Victimization History: not assessed         Support System: Community    Living Arrangement: Assisted  living    Home Safety  Feels Safe Living in Home: Yes    Income Information  Employment Status for: Patient  Employment Status: Disabled  Income Source: Disability    [employer] Service/Education History  Current or Previous  Service: None         Legal  Legal Considerations: Patient/ Family Capacity to Make Sound Judgments  Criminal Activity/ Legal Involvement Pertinent to Current Situation/ Hospitalization: none    Drug Screening  Have you used any substances (canabis, cocaine, heroin, hallucinogens, inhalants, etc.) in the past 12 months?: No  Have you used any prescription drugs other than prescribed in the past 12 months?: No  Is a toxicology screen needed?: Yes         Psychosocial  Psychosocial (WDL): Exceptions to WDL  Behaviors/Mood: Appropriate for age, Appropriate for situation, Calm, Cooperative, Sad  Affect: Appropriate to circumstances         General Appearance  Motor Activity: Unremarkable  Speech Pattern: Other (Comment) (WDL)  General Attitude: Cooperative    Thought Process  Content: Unremarkable  Delusions: Other (Comment) (none)  Perception: Hallucinations  Hallucination: Auditory  Judgment/Insight: Unable to assess  Confusion: None  Cognition: Appropriate attention/concentration, Follows commands, Poor judgement, Poor safety awareness, Cognitive delay    Sleep Pattern  Sleep Pattern: Sleeps all night    Risk Factors  Self Harm/Suicidal Ideation Plan: SI with a plan  Previous Self Harm/Suicidal Plans: SA via cutting  Risk Factors: 1st psychiatric hospitalization by age 19, Age < 19 years old, Command hallucinations, Lower IQ, Major mental illness    Violence Risk Assessment  Assessment of Violence: None noted  Thoughts of Harm to Others: No    Ability to Assess Risk Screen  Risk Screen - Ability to Assess: Able to be screened  Ask Suicide-Screening Questions  1. In the past few weeks, have you wished you were dead?: Yes  2. In the past few weeks, have you felt that you or your family would  be better off if you were dead?: Yes  3. In the past week, have you been having thoughts about killing yourself?: Yes  4. Have you ever tried to kill yourself?: Yes  How did you try to kill yourself?: SA via cutting  5. Are you having thoughts of killing yourself right now?: Yes  Calculated Risk Score: Imminent Risk  [place] Suicide Severity Rating Scale (Screener/Recent Self-Report)  1. Wish to be Dead (Past 1 Month): Yes  2. Non-Specific Active Suicidal Thoughts (Past 1 Month): Yes  3. Active Suicidal Ideation with any Methods (Not Plan) Without Intent to Act (Past 1 Month): Yes  4. Active Suicidal Ideation with Some Intent to Act, Without Specific Plan (Past 1 Month): Yes  5. Active Suicidal Ideation with Specific Plan and Intent (Past 1 Month): Yes  6. Suicidal Behavior (Lifetime): Yes  6. Suicidal Behavior (3 Months): No  Calculated C-SSRS Risk Score (Lifetime/Recent): High Risk  Step 1: Risk Factors  Current & Past Psychiatric Dx: Mood disorder, Psychotic disorder, ADHD  Presenting Symptoms: Impulsivity, Hopelessness or despair, Command hallucinations  Precipitants/Stressors: Other (Comment) (denied)  Change in Treatment: Other (Comment) (none)  Access to Lethal Methods : No  Step 2: Protective Factors   Protective Factors Internal: Ability to cope with stress, Frustration tolerance  Protective Factors External: Other (Comment) (none)  Step 3: Suicidal Ideation Intensity  Most Severe Suicidal Ideation Identified: SI with a plan  How Many Times Have You Had These Thoughts: Daily or almost daily  When You Have the Thoughts How Long do They Last : 1-4 hours/a lot of the time  Could/Can You Stop Thinking About Killing Yourself or Wanting to Die if You Want to: Can control thoughts with some difficulty  Are There Things - Anyone or Anything - That Stopped You From Wanting to Die or Acting on: Uncertain that deterrents stopped you  What Sort of Reasons Did You Have For Thinking About Wanting to Die or Killing  Yourself: Equally to get attention, revenge or a reaction from others and to end/stop the pain  Total Score: 16  Step 5: Documentation  Risk Level: High suicide risk    Psychiatric Impression and Plan of Care  Assessment and Plan: The patient was interviewed via telehealth. Pt is 33 years old  female with a history of bipolar disorder, developmental disability, depression, and ADHD, was brought to the ED for SI. During the assessment, pt was lying in bed, dressed in hospital attire. Pt was sad, calm, and cooperative. Pt stated that she “feels suicidal.” Pt denied any triggers. Pt reported having a plan to cut herself with a knife. Pt is high-risk on the [place] SSRS. Pt has a history of SA via cutting. Pt has a limited support system: Sergei, a . Pt has limited protective factors: some ability to cope with stress and frustration tolerance. Pt was not future-oriented: “I don’t know.” Pt denied access to firearms. Pt denied HI and VH. Pt reported AH “voices” that telling her to kill herself. Pt stated that she started to hear voices recently.  Pt is her own guardian      Bing, the group home [facility] worker, was present with the pt at the ED. She stepped out for the assessment but was able to provide collateral information after the assessment. She stated that usually pt is “fun and outgoing,” but recently she was isolating more, eating alone, and listening to depressive music. Today, pt was “punching a pillow” and saying that she “wants to just die.” Group home workers called pt’s therapist, who talked to the pt. Pt expressed SI with a plan to the therapist. Bing thought that it was not safe for the pt to be discharged and that she would benefit from inpatient admission.         DX: depression           At this time, pt meets the criteria for inpatient psychiatric admission for further evaluation, stability, treatment, and safety. Reviewed with Dr. Bartlett, who is in  agreement.    Specific Resources Provided to Patient: inpatient admission  CM Notified: no  PHP/IOP Recommended: none    Outcome/Disposition  Patient's Perception of Outcome Achieved: unknown  Assessment, Recommendations and Risk Level Reviewed with: Dr. Bartlett  Contact Name: [employer]Heydi manager  Contact Number(s): [phone]  Contact Name: [place]/  Contact Number: [phone]  EPAT Assessment Completed Date: 07/04/24  EPAT Assessment Completed Time: 2300    Social Work Note

## 2024-07-04 NOTE — SIGNIFICANT EVENT
Application for Emergency Admission      Ready for Transfer?  Is the patient medically cleared for transfer to inpatient psychiatry: Yes  Has the patient been accepted to an inpatient psychiatric hospital: Yes    Application for Emergency Admission  IN ACCORDANCE [place]  The Chief Clinical Officer of: [facility] 7/4/2024 .3:58 AM    Reason for Hospitalization  The undersigned has reason to believe that: Gissel Greene Is a mentally ill person subject to hospitalization by court order under division B Section 5122.01 of the Revised Code, i.e., this person:    1.Yes  Represents a substantial risk of physical harm to self as manifested by evidence of threats of, or attempts at, suicide or serious self-inflicted bodily harm    2.Yes Represents a substantial risk of physical harm to others as manifested by evidence of recent homicidal or other violent behavior, evidence of recent threats that place another in reasonable fear of violent behavior and serious physical harm, or other evidence of present dangerousness    3.Yes Represents a substantial and immediate risk of serious physical impairment or injury to self as manifested by  evidence that the person is unable to provide for and is not providing for the person's basic physical needs because of the person's mental illness and that appropriate provision for those needs cannot be made  immediately available in the community    4.Yes Would benefit from treatment in a hospital for his mental illness and is in need of such treatment as manifested by evidence of behavior that creates a grave and imminent risk to substantial rights of others or  himself.    5.Yes Would benefit from treatment as manifested by evidence of behavior that indicates all of the following:       (a) The person is unlikely to survive safely in the community without supervision, based on a clinical determination.       (b) The person has a history of lack of  compliance with treatment for mental illness and one of the following applies:      (i) At least twice within the thirty-six months prior to the filing of an affidavit seeking court-ordered treatment of the person under section 5122.111 of the Revised Code, the lack of compliance has been a significant factor in necessitating hospitalization in a hospital or receipt of services in a forensic or other mental health unit of a correctional facility, provided that the thirty-six-month period shall be extended by the length of any hospitalization or incarceration of the person that occurred within the thirty-six-month period.      (ii) Within the forty-eight months prior to the filing of an affidavit seeking court-ordered treatment of the person under section 5122.111 of the Revised Code, the lack of compliance resulted in one or more acts of serious violent behavior toward self or others or threats of, or attempts at, serious physical harm to self or others, provided that the forty-eight-month period shall be extended by the length of any hospitalization or incarceration of the person that occurred within the forty-eight-month period.      (c) The person, as a result of mental illness, is unlikely to voluntarily participate in necessary treatment.       (d) In view of the person's treatment history and current behavior, the person is in need of treatment in order to prevent a relapse or deterioration that would be likely to result in substantial risk of serious harm to the person or others.    (e) Represents a substantial risk of physical harm to self or others if allowed to remain at liberty pending examination.    Therefore, it is requested that said person be admitted to the above named facility.    STATEMENT OF BELIEF    Must be filled out by one of the following: a psychiatrist, licensed physician, licensed clinical psychologist, health or ,  or .  (Statement shall include the  circumstances under which the individual was taken into custody and the reason for the person's belief that hospitalization is necessary. The statement shall also include a reference to efforts made to secure the individual's property at his residence if he was taken into custody there. Every reasonable and appropriate effort should be made to take this person into custody in the least conspicuous manner possible.)         King Bartlett MD 7/4/2024     _____________________________________________________________   Place of Employment: [facility]    STATEMENT OF OBSERVATION BY PSYCHIATRIST, LICENSED PHYSICIAN, OR LICENSED CLINICAL PSYCHOLOGIST, IF APPLICABLE    Place of Observation (e.g., [facility], general hospital, office, emergency facility)  (If applicable, please complete)    King Bartlett MD 7/4/2024    _____________________________________________________________

## 2024-07-05 PROBLEM — F31.9 BIPOLAR DEPRESSION (MULTI): Status: ACTIVE | Noted: 2024-07-05

## 2024-07-05 LAB
CHOLEST SERPL-MCNC: 237 MG/DL (ref 0–199)
CHOLESTEROL/HDL RATIO: 4.9
EST. AVERAGE GLUCOSE BLD GHB EST-MCNC: 97 MG/DL
HBA1C MFR BLD: 5 %
HDLC SERPL-MCNC: 48.7 MG/DL
LDLC SERPL CALC-MCNC: 157 MG/DL
NON HDL CHOLESTEROL: 188 MG/DL (ref 0–149)
TRIGL SERPL-MCNC: 156 MG/DL (ref 0–149)
TSH SERPL-ACNC: 1.69 MIU/L (ref 0.44–3.98)
VLDL: 31 MG/DL (ref 0–40)

## 2024-07-05 PROCEDURE — 36415 COLL VENOUS BLD VENIPUNCTURE: CPT | Performed by: PSYCHIATRY & NEUROLOGY

## 2024-07-05 PROCEDURE — 2500000001 HC RX 250 WO HCPCS SELF ADMINISTERED DRUGS (ALT 637 FOR MEDICARE OP): Performed by: PSYCHIATRY & NEUROLOGY

## 2024-07-05 PROCEDURE — 84443 ASSAY THYROID STIM HORMONE: CPT | Performed by: PSYCHIATRY & NEUROLOGY

## 2024-07-05 PROCEDURE — 2500000001 HC RX 250 WO HCPCS SELF ADMINISTERED DRUGS (ALT 637 FOR MEDICARE OP): Performed by: STUDENT IN AN ORGANIZED HEALTH CARE EDUCATION/TRAINING PROGRAM

## 2024-07-05 PROCEDURE — 83036 HEMOGLOBIN GLYCOSYLATED A1C: CPT | Mod: ELYLAB | Performed by: PSYCHIATRY & NEUROLOGY

## 2024-07-05 PROCEDURE — 83718 ASSAY OF LIPOPROTEIN: CPT | Performed by: PSYCHIATRY & NEUROLOGY

## 2024-07-05 PROCEDURE — 99222 1ST HOSP IP/OBS MODERATE 55: CPT | Performed by: PSYCHIATRY & NEUROLOGY

## 2024-07-05 PROCEDURE — 1140000001 HC PRIVATE PSYCH ROOM DAILY

## 2024-07-05 PROCEDURE — 2500000002 HC RX 250 W HCPCS SELF ADMINISTERED DRUGS (ALT 637 FOR MEDICARE OP, ALT 636 FOR OP/ED): Performed by: STUDENT IN AN ORGANIZED HEALTH CARE EDUCATION/TRAINING PROGRAM

## 2024-07-05 PROCEDURE — 97150 GROUP THERAPEUTIC PROCEDURES: CPT | Mod: GO

## 2024-07-05 RX ORDER — LITHIUM CARBONATE 300 MG/1
300 TABLET, FILM COATED, EXTENDED RELEASE ORAL 2 TIMES DAILY
Status: DISCONTINUED | OUTPATIENT
Start: 2024-07-05 | End: 2024-07-06

## 2024-07-05 RX ORDER — LEVOTHYROXINE SODIUM 75 UG/1
75 TABLET ORAL DAILY
Status: DISCONTINUED | OUTPATIENT
Start: 2024-07-05 | End: 2024-07-09 | Stop reason: HOSPADM

## 2024-07-05 RX ORDER — RISPERIDONE 1 MG/1
4 TABLET ORAL NIGHTLY
Status: DISCONTINUED | OUTPATIENT
Start: 2024-07-05 | End: 2024-07-05

## 2024-07-05 RX ORDER — PROPRANOLOL HYDROCHLORIDE 20 MG/1
20 TABLET ORAL DAILY
Status: DISCONTINUED | OUTPATIENT
Start: 2024-07-05 | End: 2024-07-09 | Stop reason: HOSPADM

## 2024-07-05 RX ORDER — PANTOPRAZOLE SODIUM 40 MG/1
40 TABLET, DELAYED RELEASE ORAL
Status: DISCONTINUED | OUTPATIENT
Start: 2024-07-06 | End: 2024-07-09 | Stop reason: HOSPADM

## 2024-07-05 SDOH — HEALTH STABILITY: MENTAL HEALTH: MENTAL HEALTH CONDITIONS/ SYMPTOMS: BIPOLAR AFFECTIVE DISORDER

## 2024-07-05 SDOH — HEALTH STABILITY: MENTAL HEALTH: HAVE YOU EVER TRIED TO KILL YOURSELF?: YES

## 2024-07-05 SDOH — SOCIAL STABILITY: SOCIAL INSECURITY: ASSESSMENT OF VIOLENCE: NONE NOTED

## 2024-07-05 SDOH — SOCIAL STABILITY: SOCIAL INSECURITY: RELIGIOUS/CULTURAL FACTORS: UNABLE TO ASSESS

## 2024-07-05 SDOH — SOCIAL STABILITY: SOCIAL INSECURITY: THOUGHTS OF HARM TO OTHERS: NO

## 2024-07-05 SDOH — ECONOMIC STABILITY: HOUSING INSECURITY: POTENTIALLY UNSAFE HOUSING CONDITIONS: UNABLE TO ASSESS

## 2024-07-05 SDOH — HEALTH STABILITY: MENTAL HEALTH: NON-SPECIFIC ACTIVE SUICIDAL THOUGHTS (PAST 1 MONTH): YES

## 2024-07-05 SDOH — HEALTH STABILITY: MENTAL HEALTH: EMOTIONAL/ PSCYHOLOGICAL COMMENTS: PT SHARED SHE IS LINKED WITH THE CENTERS FOR FAMILIES AND CHILDREN

## 2024-07-05 SDOH — SOCIAL STABILITY: SOCIAL INSECURITY: FEELS SAFE LIVING IN HOME: YES

## 2024-07-05 SDOH — HEALTH STABILITY: MENTAL HEALTH: IN THE PAST FEW WEEKS, HAVE YOU WISHED YOU WERE DEAD?: YES

## 2024-07-05 SDOH — HEALTH STABILITY: MENTAL HEALTH: ACTIVE SUICIDAL IDEATION WITH SOME INTENT TO ACT, WITHOUT SPECIFIC PLAN (PAST 1 MONTH): YES

## 2024-07-05 SDOH — HEALTH STABILITY: MENTAL HEALTH: IN THE PAST FEW WEEKS, HAVE YOU FELT THAT YOU OR YOUR FAMILY WOULD BE BETTER OFF IF YOU WERE DEAD?: YES

## 2024-07-05 SDOH — HEALTH STABILITY: MENTAL HEALTH: IN THE PAST WEEK, HAVE YOU BEEN HAVING THOUGHTS ABOUT KILLING YOURSELF?: YES

## 2024-07-05 SDOH — HEALTH STABILITY: MENTAL HEALTH: REPORTED TYPE OF SUBSTANCE: ALCOHOL

## 2024-07-05 SDOH — HEALTH STABILITY: MENTAL HEALTH: HOW DID YOU TRY TO KILL YOURSELF?: SA CUTTING

## 2024-07-05 SDOH — HEALTH STABILITY: MENTAL HEALTH: WHEN DID YOU TRY TO KILL YOURSELF?: \AWHILE AGO\""

## 2024-07-05 SDOH — HEALTH STABILITY: MENTAL HEALTH: SUBSTANCE USE: PT DENIES AOD USE WITH LISW-S

## 2024-07-05 SDOH — HEALTH STABILITY: MENTAL HEALTH: ACTIVE SUICIDAL IDEATION WITH SPECIFIC PLAN AND INTENT (PAST 1 MONTH): YES

## 2024-07-05 SDOH — HEALTH STABILITY: MENTAL HEALTH: FAMILY HISTORY SUBSTANCE USE: FATHER

## 2024-07-05 SDOH — HEALTH STABILITY: MENTAL HEALTH: SUICIDAL BEHAVIOR (LIFETIME): YES

## 2024-07-05 SDOH — HEALTH STABILITY: MENTAL HEALTH: ARE YOU HAVING THOUGHTS OF KILLING YOURSELF RIGHT NOW?: YES

## 2024-07-05 SDOH — HEALTH STABILITY: MENTAL HEALTH: WISH TO BE DEAD (PAST 1 MONTH): YES

## 2024-07-05 SDOH — ECONOMIC STABILITY: HOUSING INSECURITY

## 2024-07-05 SDOH — HEALTH STABILITY: MENTAL HEALTH: SUICIDAL BEHAVIOR (DESCRIPTION): CUT ARM

## 2024-07-05 SDOH — HEALTH STABILITY: MENTAL HEALTH: DESCRIBE YOUR THOUGHTS OF KILLING YOURSELF RIGHT NOW:: CUT ARMS

## 2024-07-05 SDOH — HEALTH STABILITY: MENTAL HEALTH: BEHAVIOR: ORIENTED

## 2024-07-05 SDOH — HEALTH STABILITY: MENTAL HEALTH: SUICIDAL BEHAVIOR (3 MONTHS): YES

## 2024-07-05 ASSESSMENT — PAIN SCALES - GENERAL
PAINLEVEL_OUTOF10: 5 - MODERATE PAIN
PAINLEVEL_OUTOF10: 4
PAINLEVEL_OUTOF10: 0 - NO PAIN
PAINLEVEL_OUTOF10: 0 - NO PAIN

## 2024-07-05 ASSESSMENT — ACTIVITIES OF DAILY LIVING (ADL): BATHING: NO ASSISTANCE

## 2024-07-05 ASSESSMENT — PATIENT HEALTH QUESTIONNAIRE - PHQ9
2. FEELING DOWN, DEPRESSED OR HOPELESS: SEVERAL DAYS
SUM OF ALL RESPONSES TO PHQ9 QUESTIONS 1 AND 2: 2
1. LITTLE INTEREST OR PLEASURE IN DOING THINGS: SEVERAL DAYS

## 2024-07-05 ASSESSMENT — COLUMBIA-SUICIDE SEVERITY RATING SCALE - C-SSRS
2. HAVE YOU ACTUALLY HAD ANY THOUGHTS OF KILLING YOURSELF?: NO
1. SINCE LAST CONTACT, HAVE YOU WISHED YOU WERE DEAD OR WISHED YOU COULD GO TO SLEEP AND NOT WAKE UP?: NO
2. HAVE YOU ACTUALLY HAD ANY THOUGHTS OF KILLING YOURSELF?: NO
2. HAVE YOU ACTUALLY HAD ANY THOUGHTS OF KILLING YOURSELF?: NO
6. HAVE YOU EVER DONE ANYTHING, STARTED TO DO ANYTHING, OR PREPARED TO DO ANYTHING TO END YOUR LIFE?: NO
1. SINCE LAST CONTACT, HAVE YOU WISHED YOU WERE DEAD OR WISHED YOU COULD GO TO SLEEP AND NOT WAKE UP?: NO
6. HAVE YOU EVER DONE ANYTHING, STARTED TO DO ANYTHING, OR PREPARED TO DO ANYTHING TO END YOUR LIFE?: NO
6. HAVE YOU EVER DONE ANYTHING, STARTED TO DO ANYTHING, OR PREPARED TO DO ANYTHING TO END YOUR LIFE?: NO
5. HAVE YOU STARTED TO WORK OUT OR WORKED OUT THE DETAILS OF HOW TO KILL YOURSELF? DO YOU INTEND TO CARRY OUT THIS PLAN?: NO
1. SINCE LAST CONTACT, HAVE YOU WISHED YOU WERE DEAD OR WISHED YOU COULD GO TO SLEEP AND NOT WAKE UP?: NO

## 2024-07-05 ASSESSMENT — PAIN - FUNCTIONAL ASSESSMENT
PAIN_FUNCTIONAL_ASSESSMENT: 0-10
PAIN_FUNCTIONAL_ASSESSMENT: 0-10

## 2024-07-05 ASSESSMENT — PAIN DESCRIPTION - LOCATION: LOCATION: HEAD

## 2024-07-05 NOTE — PROGRESS NOTES
Occupational Therapy     REHAB Therapy Assessment & Treatment    Patient Name: Gissel Greene  MRN: [id number]  Today's Date: 7/5/2024      Activity:  Self-Esteem Group    Attendance:  Attendance  Activity: Discussion/reminisce  Participation: Active participation    Treatment Approach  Approach : Group therapy sessions  Patient Stated Goals: none stated  Cognition: Attention, Directions (Pt alert, oriented, & attentive. Follows complex multi-step directions w/ mod VCs.)  Social Skills: Demonstrates ability to listen to others, Interacts independently in social activity  Emotional: Mood (Pt mood euthymic, affect animated & appropriate)  Treatment Approach Comments: Pt attended & participated in morning therapy group focused on self-esteem. Pt educated on concept of self-esteem & participated in discussion of how negative self-esteem can impact occupational functioning. Pt completed self-esteem self-assessment quiz, which asked pt to reflect upon whether they agree w/ a variety of statements related to self-esteem. Next, pt educated on strategies to improve self-esteem, including challenging irrational thoughts, positive self-talk, gratitude exercises, & using personal strengths. Pt participated in practice of 2 strategies to improve self-esteem. First strategy involved identifying & challenging negative core beliefs. Pt had some difficulty understanding this task & stated she was unable to identify any negative core beliefs. Through discussion w/ pt about thoughts she has that cause her stress, pt able to name a negative thought she has when depressed & then challenged this w/ positive qualities. Second strategy, focused in positive psychology, involved pt identifying at least 3 personal strengths & activities they can engage in to demonstrate those strengths. Pt selected strengths & activities. Pt shared w/ group that one of their strengths is kindness & a way they can demonstrate it is by giving to others. Pt  demonstrates emerging understanding.      Encounter Problems       Encounter Problems (Active)       OT Goals       Pt will explore, identify, and appropriately utilize effective coping strategies to cope with daily stressors and manage emotions with independence prior to discharge.  (Progressing)       Start:  07/04/24    Expected End:  08/01/24            Pt will demonstrate ability to appropriately modulate emotions and impulses with independence prior to discharge.  (Progressing)       Start:  07/04/24    Expected End:  08/01/24            Pt will explore, identify, and appropriately utilize effective communication strategies to express feelings, wants, and needs with independence prior to discharge.  (Progressing)       Start:  07/04/24    Expected End:  08/01/24                   Education:  Pt given educational handouts on self-esteem, core beliefs, & strengths use plan. Reviewed, discussed, & practiced. Pt demonstrates emerging understanding; could benefit from reinforced education.
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
## 2024-07-05 NOTE — CARE PLAN
"Patient is out on unit and social with peers. Patient reports good appetite. Patient reports good sleep. Patient reports attending groups. Patient denies suicidal ideation and homicidal ideation. Patient verbally contract for safety while on unit. Pt in agreement to notify staff with change of thoughts. Patient denies auditory and visual hallucinations at this time. Pt doesn't appear internally stimulated. No voiced delusions at this time.   Patient alert and oriented.    The patient's goals for the shift include Attend groups    The clinical goals for the shift include \"Maintain positive attitude\"    Problem: Sensory Perceptual Alteration as Evidenced by  Goal: Initiates reality-based interactions  Outcome: Progressing  Goal: Will not act on psychotic perception  Outcome: Progressing     Problem: Ineffective Coping  Goal: LTG - Verbalizes alternatives to suicide  Outcome: Progressing     "

## 2024-07-05 NOTE — CARE PLAN
The patient's goals for the shift include sleep    The clinical goals for the shift include patient will sleep through the night    Over the shift, the patient did make progress toward the following goals. Patient was calm and cooperative throughout the shift. She was sleeping at the start of the shift and slept until 3:30 am when she woke up to get tylenol for a headache and back pain. She was given PRN tylenol which was effective. She was isolated to her room throughout the shift. She denies SI, HI at this time. She laid back down and slept until 6 am. When she woke this morning she was more friendly and coming out of her room socializing with staff and peers. Nursing will continue to monitor and encourage.

## 2024-07-05 NOTE — CARE PLAN
Spoke with  of the group home  patient lives in.  She said patient sees a therapist via zoom on Mondays and Wednesday and her PCP prescribes her psychiatry medications.

## 2024-07-05 NOTE — PROGRESS NOTES
Social Work Note       07/05/24 1500   Referral Data   Referral Source Physician   Referral Name Dr. Flor   Referral Reason Psychosocial assessment   County Information   County of Residence [facility]   Patient Information   Primary Caregiver Self   Provides Primary Care For No One   Accompanied by/Relationship No One   Support System Community   Lives With Living in a group home   Congregational/Cultural Factors unable to assess   SW Readmission Information    Readmission within 30 Days No   Home Safety   Feels Safe Living in Home Yes   Potentially Unsafe Housing Conditions Unable to Assess   Home Safety  Denies having access to firearms. 's name is joe.   Family Member Substance Use   Family History Substance Use Father   Reported Type of Substance Alcohol   Substance Use Pt denies AoD use with [facility]W-[facility]   Activities of Daily Living   Functional Status Independent   Assistive Device Not applicable   Living Arrangement Other (Comment)  (Group home)   Ambulation Independent   Dressing Independent   Feeding Independent   Disability Speech   Bathing/Grooming No assistance   Behavior Oriented   Communication Can write;Talks;Understands speaking;Understands English   Income Information   Employment Status for Patient   Employment Status Disabled   Income Source Disability   Emotional/ Psychological   Person Assessed Patient   Affect No Deficits Noted   Behaviors/Mood Cooperative   Verbal Skills Other (Comment)  (restricted. Pt is activitely working with Speech Therapy in the community.)   Current Interpersonal Conduct/ Behavior Appropriate to Situation   Mental Health Conditions/ Symptoms Bipolar Affective Disorder   Thought Process Alterations No Deficits Noted   Emotional/ Pscyhological Comments Pt shared she is linked with the [facility]   [place] Suicide Severity Rating Scale (Screener/Recent Self-Report)   1. Wish to be Dead (Past 1 Month) Y   2. Non-Specific Active Suicidal  "Thoughts (Past 1 Month) Y   3. Active Suicidal Ideation with any Methods (Not Plan) Without Intent to Act (Past 1 Month) Y   4. Active Suicidal Ideation with Some Intent to Act, Without Specific Plan (Past 1 Month) Y   5. Active Suicidal Ideation with Specific Plan and Intent (Past 1 Month) Y   6. Suicidal Behavior (Lifetime) Y   6. Suicidal Behavior (3 Months) Y   6. Suicidal Behavior (Description) cut arm   Ask Suicide-Screening Questions   1. In the past few weeks, have you wished you were dead? Y   2. In the past few weeks, have you felt that you or your family would be better off if you were dead? Y   3. In the past week, have you been having thoughts about killing yourself? Y   4. Have you ever tried to kill yourself? Y   How did you try to kill yourself? SA cutting   When did you try to kill yourself? \"awhile ago\"   5. Are you having thoughts of killing yourself right now? Y   Describe your thoughts of killing yourself right now: Cut arms   Calculated Risk Score Imminent Risk   Over the past 2 weeks, how often have you been bothered by any of the following problems?   Little interest or pleasure in doing things Several days   Feeling down, depressed, or hopeless Several days   Patient Health Questionnaire-2 Score 2   Violence Risk Assessment   Assessment of Violence None noted   Thoughts of Harm to Others No   Legal   Legal Comments Pt denies any current legal issues at this time.     Per ED:    HPI        Chief Complaint   Patient presents with    Suicidal       Pt to ED for suicidal ideation, she states her plan is to get a knife and slice her arm. Pt was sent by her therapist to ED, she was at the therapist today. Pt's speech is very pressured and she states she does have a history of attempts.          HPI  Patient is a 33-year-old female with history of bipolar disorder, sent to the ED today from her counselor for suicidal ideation.  Patient resides at a group home.  Patient states that she is suicidal " "\"all the time.\"  Today when speaking with her counselor, she told them that her plan would be to cut herself with a knife.  However as she resides at the group home, she does not have access to knives because they are locked away.  At this time, patient is unable to verbalize any exacerbating factors of her suicidal ideation.  She does have previous history of self-harm.  She otherwise has no medical concerns.  "

## 2024-07-06 PROCEDURE — 99232 SBSQ HOSP IP/OBS MODERATE 35: CPT | Performed by: PSYCHIATRY & NEUROLOGY

## 2024-07-06 PROCEDURE — 2500000002 HC RX 250 W HCPCS SELF ADMINISTERED DRUGS (ALT 637 FOR MEDICARE OP, ALT 636 FOR OP/ED): Performed by: STUDENT IN AN ORGANIZED HEALTH CARE EDUCATION/TRAINING PROGRAM

## 2024-07-06 PROCEDURE — 97150 GROUP THERAPEUTIC PROCEDURES: CPT | Mod: GO

## 2024-07-06 PROCEDURE — 2500000001 HC RX 250 WO HCPCS SELF ADMINISTERED DRUGS (ALT 637 FOR MEDICARE OP): Performed by: PSYCHIATRY & NEUROLOGY

## 2024-07-06 PROCEDURE — 1140000001 HC PRIVATE PSYCH ROOM DAILY

## 2024-07-06 PROCEDURE — 2500000001 HC RX 250 WO HCPCS SELF ADMINISTERED DRUGS (ALT 637 FOR MEDICARE OP): Performed by: STUDENT IN AN ORGANIZED HEALTH CARE EDUCATION/TRAINING PROGRAM

## 2024-07-06 RX ORDER — PETROLATUM 420 MG/G
OINTMENT TOPICAL
Status: DISCONTINUED | OUTPATIENT
Start: 2024-07-06 | End: 2024-07-06 | Stop reason: CLARIF

## 2024-07-06 RX ORDER — ARIPIPRAZOLE 5 MG/1
5 TABLET ORAL NIGHTLY
Status: DISCONTINUED | OUTPATIENT
Start: 2024-07-06 | End: 2024-07-09 | Stop reason: HOSPADM

## 2024-07-06 RX ORDER — TRAZODONE HYDROCHLORIDE 50 MG/1
50 TABLET ORAL NIGHTLY
Status: DISCONTINUED | OUTPATIENT
Start: 2024-07-06 | End: 2024-07-09 | Stop reason: HOSPADM

## 2024-07-06 RX ORDER — LITHIUM CARBONATE 450 MG/1
450 TABLET ORAL 2 TIMES DAILY
Status: DISCONTINUED | OUTPATIENT
Start: 2024-07-06 | End: 2024-07-09 | Stop reason: HOSPADM

## 2024-07-06 ASSESSMENT — PAIN SCALES - GENERAL
PAINLEVEL_OUTOF10: 2
PAINLEVEL_OUTOF10: 0 - NO PAIN
PAINLEVEL_OUTOF10: 0 - NO PAIN

## 2024-07-06 ASSESSMENT — COLUMBIA-SUICIDE SEVERITY RATING SCALE - C-SSRS
6. HAVE YOU EVER DONE ANYTHING, STARTED TO DO ANYTHING, OR PREPARED TO DO ANYTHING TO END YOUR LIFE?: NO
1. SINCE LAST CONTACT, HAVE YOU WISHED YOU WERE DEAD OR WISHED YOU COULD GO TO SLEEP AND NOT WAKE UP?: NO
6. HAVE YOU EVER DONE ANYTHING, STARTED TO DO ANYTHING, OR PREPARED TO DO ANYTHING TO END YOUR LIFE?: NO
2. HAVE YOU ACTUALLY HAD ANY THOUGHTS OF KILLING YOURSELF?: NO
2. HAVE YOU ACTUALLY HAD ANY THOUGHTS OF KILLING YOURSELF?: NO
1. SINCE LAST CONTACT, HAVE YOU WISHED YOU WERE DEAD OR WISHED YOU COULD GO TO SLEEP AND NOT WAKE UP?: NO

## 2024-07-06 ASSESSMENT — PAIN - FUNCTIONAL ASSESSMENT
PAIN_FUNCTIONAL_ASSESSMENT: 0-10

## 2024-07-06 NOTE — CARE PLAN
"The patient's goals for the shift include sleep    The clinical goals for the shift include sleep    Problem: Sensory Perceptual Alteration as Evidenced by  Goal: Initiates reality-based interactions  Outcome: Progressing  Goal: Will not act on psychotic perception  Outcome: Progressing     Problem: Ineffective Coping  Goal: LTG - Verbalizes alternatives to suicide  Outcome: Progressing  Goal: STG - Verbalizes control of suicidal thoughts/behaviors  Outcome: Progressing     Problem: Fall/Injury  Goal: Verbalize understanding of risk factor reduction measures to prevent injury from fall in the home  Outcome: Progressing     Problem: IP Suicidal Ideation  Goal: STG: Gissel Greene will verbalize understanding of suicide precautions  Outcome: Progressing   Pt calm and cooperative; friendly. Pt denies depression and anxiety, also denies auditory and visual hallucinations. Pt denies wanting to harm herself. Pt states she is \"feeling good\". Pt compliant with medication administration. Ibuprofen given for a headache and Trazodone given for sleep. Pt appeared to sleep well overnight and is still currently sleeping; will continue to monitor and support.  "

## 2024-07-06 NOTE — PROGRESS NOTES
"Occupational Therapy                 Therapy Communication Note    Patient Name: Gissel Greene  MRN: [id number]  Today's Date: 7/6/2024     Discipline: Occupational Therapy    Missed Visit Reason:  Pt 's 1;1 sitter declined- pt want to \"take a nap\"    Missed Time: Cancel    Comment:  "

## 2024-07-06 NOTE — CARE PLAN
Problem: Sensory Perceptual Alteration as Evidenced by  Goal: Initiates reality-based interactions  Outcome: Progressing  Goal: Will not act on psychotic perception  Outcome: Progressing     Problem: Ineffective Coping  Goal: LTG - Verbalizes alternatives to suicide  Outcome: Progressing  Goal: STG - Verbalizes control of suicidal thoughts/behaviors  Outcome: Progressing     Problem: Fall/Injury  Goal: Verbalize understanding of risk factor reduction measures to prevent injury from fall in the home  Outcome: Progressing     Problem: IP Suicidal Ideation  Goal: STG: Gissel HOLLIS Jc will verbalize understanding of suicide precautions  Outcome: Progressing   The patient's goals for the shift include sleep    The clinical goals for the shift include attend groups. Pt did attend groups and was social with peers and staff. Verbal with needs. Had to be redirected a few times as to volume in dining room and not to run in airas, otherwise helpful, no internal stimulation noted. Denies SI/HI/AVH at this time.

## 2024-07-06 NOTE — PROGRESS NOTES
Gissel Greene is a 33 y.o. female on day 2 of admission has had some difficulty with sleep and trazodone would be offered to her as she requested for it.  Plan discussed about adjustment of medication as she did not wish to take risperidone all for mood stabilization.  Patient was offered Abilify benefits risk side effects discussed and she agreed to try it    Patient is tolerating the plata milieu well.   Patient is attending the groups and meetings and finds them useful in developing more understanding of their symptoms and developing coping skills. Patient is tolerating medications well.   Labs Reviewed.  Vitals Reviewed.   Nursing Notes Reviewed.   No EPS, TD, vitals stable.      MSE: Patient was alert, oriented to time, place, person and situation. Patient appears well groomed and clad in climate appropriate clothes. Patient is cooperative on approach. Recent and remote memory within normal limits. Memory registration and recall within normal limits. Attention and concentration within normal limits. Speech normal in rate, rhythm and volume. Good eye contact. Thought process Linear. Intact associations. Good fund of knowledge. Mood sad and affect constricted. Patient did not endorse any delusions. Patient denied any auditory visual hallucinations. Patient has denied any active suicidal  ideations and is not future oriented.  Patient has fair insight, fair judgment and good impulse control.     Musculoskeletal: Normal gait, no Parkinsonism, no Dystonia, no Akathisia, no TD. Psychomotor activity within normal limits.     Diagnosis: Bipolar 1 depressed    Assessment and Plan:     Continue current treatment and start Abilify 5 mg at bedtime along with trazodone 50 at bedtime  Need for Inpatient Care: Medication titration, monitoring for side effects mood and impulsivity.  Plan for inpatient psychiatric care: Continue with psychiatric care in [facility].  Continue with current treatment and medication adjustments.  "Patient is agreeable with continued medication management and adjustments discussed.         Last Recorded Vitals  /58 (Patient Position: Sitting)   Pulse 75   Temp 36.6 °C (97.9 °F) (Temporal)   Resp 16   Ht 1.549 m (5' 1\")   Wt 68.5 kg (151 lb)   SpO2 97%   BMI 28.53 kg/m²      No results found for this or any previous visit (from the past 24 hour(s)).       Current Facility-Administered Medications:     acetaminophen (Tylenol) tablet 650 mg, 650 mg, oral, q6h PRN, Garrison Flor MD, 650 mg at 07/05/24 0326    alum-mag hydroxide-simeth (Mylanta) 200-200-20 mg/5 mL oral suspension 10 mL, 10 mL, oral, 4x daily PRN, Garrison Flor MD, 10 mL at 07/05/24 1134    ARIPiprazole (Abilify) tablet 5 mg, 5 mg, oral, Nightly, Garrison Flor MD    eucerin cream 1 Application, 1 Application, Topical, q1h PRN, Medina Garcia, PharmD    hydrOXYzine HCL (Atarax) tablet 50 mg, 50 mg, oral, q6h PRN, Garrison Flor MD, 50 mg at 07/05/24 1300    ibuprofen tablet 600 mg, 600 mg, oral, q6h PRN, Garrison Flor MD, 600 mg at 07/05/24 2013    lamoTRIgine (LaMICtal) tablet 100 mg, 100 mg, oral, BID, Garrison Flor MD, 100 mg at 07/05/24 2013    levothyroxine (Synthroid, Levoxyl) tablet 75 mcg, 75 mcg, oral, Daily, Armen Rojas MD, 75 mcg at 07/06/24 0539    lithium ER (Eskalith) extended release tablet 450 mg, 450 mg, oral, BID, Garrison Folr MD    OLANZapine (ZyPREXA) injection 5 mg, 5 mg, intramuscular, q6h PRN, Garrison Flor MD    OLANZapine (ZyPREXA) tablet 5 mg, 5 mg, oral, q6h PRN, Garrison Flor MD    pantoprazole (ProtoNix) EC tablet 40 mg, 40 mg, oral, Daily before breakfast, Armen Rojas MD, 40 mg at 07/06/24 0539    propranolol (Inderal) tablet 20 mg, 20 mg, oral, Daily, Armen Rojas MD, 20 mg at 07/05/24 1300    traZODone (Desyrel) tablet 50 mg, 50 mg, oral, Nightly PRN, Garrison Flor MD, 50 mg at 07/05/24 2013    traZODone (Desyrel) tablet 50 mg, 50 mg, oral, Nightly, MD Garrison Dueñas MD    "

## 2024-07-06 NOTE — PROGRESS NOTES
Occupational Therapy     REHAB Therapy Group Treatment (A.M)    Patient Name: Gissel Greene  MRN: [id number]  Today's Date: 7/6/2024  Time in 0947  Time out 10:47  Activity Assessment:   Pt demonstrated good eye contact and initiation to participate during OT group discussion. Pt demonstrated good initiation /self disclosure without expressing current need to commit suicide     OT Interventions group/1:1 : Therapeutic use of self/activities, OT Task Skills Group, OT Life Skills Management Skills, Grief Management/Anger Management ,  ADL/I ADL  , Positive Coping Skills/Stress Management, Community Re-Entry, Relaxation, Self Esteem, Communication Skills, Time Management Skills, Addiction education/prevention   Attendance:  Attendance  Activity:  (positve coping skills)  Participation: Active participation    Therapeutic Recreation:  Treatment Approach  Approach :  (60 min OT group)  Patient Stated Goals: none stated  Cognition: Attention, Directions  Social Skills: Demonstrates ability to listen to others  Emotional: Mood  Treatment Approach Comments: Pt seen this date for positive coping skills as related to resolving barriers as related to taking medication, 11 skills promoting positive coping skills vs anxiety provoking thoughts ( handout provided). Pt  appeared to demonstrate good comprehension of all education  as observed by active initation skills - answers appropriate for OT group content. Pt remained cheerfulness throughout OT group intervention with peers/ OT group facilitator demonstrating good initation/motivation throughout entire OT group .Eye contact was good . No verbalizations of suicidal ideation were expressed although pt initated self disclosing that her negative coping strategies included cutting herself. Pt did not express current feeling of need to cut herself.      Encounter Problems       Encounter Problems (Active)       OT Goals       Pt will explore, identify, and appropriately utilize  effective coping strategies to cope with daily stressors and manage emotions with independence prior to discharge.  (Progressing)       Start:  07/04/24    Expected End:  08/01/24            Pt will demonstrate ability to appropriately modulate emotions and impulses with independence prior to discharge.  (Progressing)       Start:  07/04/24    Expected End:  08/01/24            Pt will explore, identify, and appropriately utilize effective communication strategies to express feelings, wants, and needs with independence prior to discharge.  (Progressing)       Start:  07/04/24    Expected End:  08/01/24                     Education Documentation  Handouts, taught by Yeny Mattson OT at 7/6/2024  3:48 PM.  Learner: Patient  Readiness: Acceptance  Method: Demonstration  Response: Demonstrated Understanding, Needs Reinforcement    Precautions, taught by Yeny Mattson OT at 7/6/2024  3:48 PM.  Learner: Patient  Readiness: Acceptance  Method: Demonstration  Response: Demonstrated Understanding, Needs Reinforcement    Education Comments  No comments found.

## 2024-07-07 VITALS
BODY MASS INDEX: 28.51 KG/M2 | SYSTOLIC BLOOD PRESSURE: 133 MMHG | WEIGHT: 151 LBS | DIASTOLIC BLOOD PRESSURE: 81 MMHG | HEART RATE: 72 BPM | HEIGHT: 61 IN | TEMPERATURE: 97 F | RESPIRATION RATE: 17 BRPM | OXYGEN SATURATION: 97 %

## 2024-07-07 LAB
ALBUMIN SERPL BCP-MCNC: 4.8 G/DL (ref 3.4–5)
ALP SERPL-CCNC: 82 U/L (ref 33–110)
ALT SERPL W P-5'-P-CCNC: 21 U/L (ref 7–45)
ANION GAP SERPL CALC-SCNC: 12 MMOL/L (ref 10–20)
AST SERPL W P-5'-P-CCNC: 15 U/L (ref 9–39)
BILIRUB SERPL-MCNC: 0.3 MG/DL (ref 0–1.2)
BUN SERPL-MCNC: 16 MG/DL (ref 6–23)
CALCIUM SERPL-MCNC: 10.1 MG/DL (ref 8.6–10.3)
CHLORIDE SERPL-SCNC: 102 MMOL/L (ref 98–107)
CO2 SERPL-SCNC: 27 MMOL/L (ref 21–32)
CREAT SERPL-MCNC: 0.91 MG/DL (ref 0.5–1.05)
EGFRCR SERPLBLD CKD-EPI 2021: 86 ML/MIN/1.73M*2
GLUCOSE SERPL-MCNC: 93 MG/DL (ref 74–99)
HOLD SPECIMEN: NORMAL
LITHIUM SERPL-SCNC: 0.71 MMOL/L (ref 0.6–1.2)
POTASSIUM SERPL-SCNC: 4.2 MMOL/L (ref 3.5–5.3)
PROT SERPL-MCNC: 7.8 G/DL (ref 6.4–8.2)
SODIUM SERPL-SCNC: 137 MMOL/L (ref 136–145)

## 2024-07-07 PROCEDURE — 97530 THERAPEUTIC ACTIVITIES: CPT | Mod: GO

## 2024-07-07 PROCEDURE — 80053 COMPREHEN METABOLIC PANEL: CPT | Performed by: PSYCHIATRY & NEUROLOGY

## 2024-07-07 PROCEDURE — 99221 1ST HOSP IP/OBS SF/LOW 40: CPT | Performed by: STUDENT IN AN ORGANIZED HEALTH CARE EDUCATION/TRAINING PROGRAM

## 2024-07-07 PROCEDURE — 17111 DESTRUCTION B9 LESIONS 15/>: CPT | Performed by: STUDENT IN AN ORGANIZED HEALTH CARE EDUCATION/TRAINING PROGRAM

## 2024-07-07 PROCEDURE — 2500000001 HC RX 250 WO HCPCS SELF ADMINISTERED DRUGS (ALT 637 FOR MEDICARE OP): Performed by: PSYCHIATRY & NEUROLOGY

## 2024-07-07 PROCEDURE — 2500000002 HC RX 250 W HCPCS SELF ADMINISTERED DRUGS (ALT 637 FOR MEDICARE OP, ALT 636 FOR OP/ED): Performed by: STUDENT IN AN ORGANIZED HEALTH CARE EDUCATION/TRAINING PROGRAM

## 2024-07-07 PROCEDURE — 80178 ASSAY OF LITHIUM: CPT | Performed by: PSYCHIATRY & NEUROLOGY

## 2024-07-07 PROCEDURE — 36415 COLL VENOUS BLD VENIPUNCTURE: CPT | Performed by: PSYCHIATRY & NEUROLOGY

## 2024-07-07 PROCEDURE — 99232 SBSQ HOSP IP/OBS MODERATE 35: CPT | Performed by: PSYCHIATRY & NEUROLOGY

## 2024-07-07 PROCEDURE — 97150 GROUP THERAPEUTIC PROCEDURES: CPT | Mod: GO

## 2024-07-07 PROCEDURE — 1240000001 HC SEMI-PRIVATE BH ROOM DAILY

## 2024-07-07 PROCEDURE — 2500000001 HC RX 250 WO HCPCS SELF ADMINISTERED DRUGS (ALT 637 FOR MEDICARE OP): Performed by: STUDENT IN AN ORGANIZED HEALTH CARE EDUCATION/TRAINING PROGRAM

## 2024-07-07 ASSESSMENT — PAIN - FUNCTIONAL ASSESSMENT
PAIN_FUNCTIONAL_ASSESSMENT: 0-10
PAIN_FUNCTIONAL_ASSESSMENT: 0-10

## 2024-07-07 ASSESSMENT — COLUMBIA-SUICIDE SEVERITY RATING SCALE - C-SSRS
1. SINCE LAST CONTACT, HAVE YOU WISHED YOU WERE DEAD OR WISHED YOU COULD GO TO SLEEP AND NOT WAKE UP?: NO
6. HAVE YOU EVER DONE ANYTHING, STARTED TO DO ANYTHING, OR PREPARED TO DO ANYTHING TO END YOUR LIFE?: NO
2. HAVE YOU ACTUALLY HAD ANY THOUGHTS OF KILLING YOURSELF?: NO
6. HAVE YOU EVER DONE ANYTHING, STARTED TO DO ANYTHING, OR PREPARED TO DO ANYTHING TO END YOUR LIFE?: NO
1. SINCE LAST CONTACT, HAVE YOU WISHED YOU WERE DEAD OR WISHED YOU COULD GO TO SLEEP AND NOT WAKE UP?: NO
2. HAVE YOU ACTUALLY HAD ANY THOUGHTS OF KILLING YOURSELF?: NO

## 2024-07-07 ASSESSMENT — PAIN SCALES - GENERAL
PAINLEVEL_OUTOF10: 0 - NO PAIN
PAINLEVEL_OUTOF10: 0 - NO PAIN
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
## 2024-07-07 NOTE — PROGRESS NOTES
Occupational Therapy     REHAB Therapy 1:1 Treatment    Patient Name: Gissel Greene  MRN: [id number]  Today's Date: 7/7/2024  Time in 11:35  Time out 12:00  Total time 25 min    Activity Assessment:   Pt demonstrated good focus/motivation to complete activity -demonstrating good mood stability/positive goal setting without expressing a desire to hurt herself.     OT Interventions group/1:1 : Therapeutic use of self/activities, OT Task Skills Group, OT Life Skills Management Skills, Grief Management/Anger Management ,  ADL/I ADL  , Positive Coping Skills/Stress Management, Community Re-Entry, Relaxation, Self Esteem, Communication Skills, Time Management Skills, Addiction education/prevention     Attendance:  Attendance  Activity:  (Discussion-self esteem)/positive affirmations  Participation: Active participation    Therapeutic Recreation:  Treatment Approach  Approach : 15 to 25 minutes  Patient Stated Goals: To be happy!  Cognition: Attention, Directions  Social Skills:  (cooperates with others)  Emotional: Mood  Stress Management/Relaxation Training: Identifies benefits of stress management/relaxation techniques  Treatment Approach Comments: Pt  unable to attend OT group in a.m. wanting to dance to music in hallway, but arrived at the end of group , motivated to attend session Pt was provided with a positve affirmations handout . Pt demonstrated good motivation to fill out sheet demonstrating positive mood/thoughts/problem solving for prompt questions. No suicidal ideation was expressed. Pt stated that her goal was to not hurt herself anymore.      Encounter Problems       Encounter Problems (Active)       OT Goals       Pt will explore, identify, and appropriately utilize effective coping strategies to cope with daily stressors and manage emotions with independence prior to discharge.  (Progressing)       Start:  07/04/24    Expected End:  08/01/24            Pt will demonstrate ability to appropriately  modulate emotions and impulses with independence prior to discharge.  (Progressing)       Start:  07/04/24    Expected End:  08/01/24            Pt will explore, identify, and appropriately utilize effective communication strategies to express feelings, wants, and needs with independence prior to discharge.  (Progressing)       Start:  07/04/24    Expected End:  08/01/24                     Education Documentation  Handouts, taught by Yeny Mattson OT at 7/7/2024  7:36 PM.  Learner: Patient  Readiness: Acceptance  Method: Demonstration  Response: Demonstrated Understanding, Needs Reinforcement    Precautions, taught by Yeny Mattson OT at 7/7/2024  7:36 PM.  Learner: Patient  Readiness: Acceptance  Method: Demonstration  Response: Demonstrated Understanding, Needs Reinforcement    Education Comments  No comments found.          Additional Comments:

## 2024-07-07 NOTE — CARE PLAN
Problem: Sensory Perceptual Alteration as Evidenced by  Goal: Initiates reality-based interactions  Outcome: Progressing  Goal: Will not act on psychotic perception  Outcome: Progressing     Problem: Ineffective Coping  Goal: LTG - Verbalizes alternatives to suicide  Outcome: Progressing  Goal: STG - Verbalizes control of suicidal thoughts/behaviors  Outcome: Progressing     Problem: Fall/Injury  Goal: Verbalize understanding of risk factor reduction measures to prevent injury from fall in the home  Outcome: Progressing     Problem: IP Suicidal Ideation  Goal: STG: Gissel Schmidtjefry will verbalize understanding of suicide precautions  Outcome: Progressing   The patient's goals for the shift include To be present on unit.    The clinical goals for the shift include To be active on therapeutic milieu. Patient has been present. Has been social with staff and peers. Attended group therapy. Has been verbal with needs and will seek assist as needed. Denies SI this 7-7 shift. Denies HI/AVH. Rested in bed for short intervals. Can be intrusive but easily redirected.

## 2024-07-07 NOTE — PROGRESS NOTES
Gissel Greene is a 33 y.o. female on day 3 of admission tolerated Abilify well.  She appears to be in better mood she slept well last night and tolerated trazodone well.  She is denying any active suicidal ideations intent or plan.  Patient is tolerating the plata milieu well.   Patient is attending the groups and meetings and finds them useful in developing more understanding of their symptoms and developing coping skills. Patient is tolerating medications well.   Labs Reviewed.  Vitals Reviewed.   Nursing Notes Reviewed.   No EPS, TD, vitals stable.      MSE: Patient was alert, oriented to time, place, person and situation. Patient appears well groomed and clad in climate appropriate clothes.  Patient is pleasant on approach and is easily excitable.  Patient is cooperative on approach. Recent and remote memory within normal limits. Memory registration and recall within normal limits. Attention and concentration within normal limits. Speech normal in rate, rhythm and volume. Good eye contact. Thought process circumstantial. Intact associations. Good fund of knowledge. Mood fine and affect excited. Patient did not endorse any delusions. Patient denied any auditory visual hallucinations. Patient has denied any active suicidal  ideations and is not future oriented.  Patient has fair insight, fair judgment and good impulse control.      Musculoskeletal: Normal gait, no Parkinsonism, no Dystonia, no Akathisia, no TD. Psychomotor activity within normal limits.      Diagnosis: Bipolar 1 depressed     Assessment and Plan:      Continue current treatment and continue Abilify 5 mg at bedtime along with trazodone 50 at bedtime  Possible discharge tomorrow  Need for Inpatient Care: Medication titration, monitoring for side effects mood and impulsivity.  Plan for inpatient psychiatric care: Continue with psychiatric care in [facility].  Continue with current treatment and medication adjustments. Patient is agreeable with  "continued medication management and adjustments discussed.       Last Recorded Vitals  /58   Pulse 67   Temp 36.4 °C (97.5 °F) (Temporal)   Resp 18   Ht 1.549 m (5' 1\")   Wt 68.5 kg (151 lb)   SpO2 97%   BMI 28.53 kg/m²      No results found for this or any previous visit (from the past 24 hour(s)).       Current Facility-Administered Medications:     acetaminophen (Tylenol) tablet 650 mg, 650 mg, oral, q6h PRN, Garrison Flor MD, 650 mg at 07/05/24 0326    alum-mag hydroxide-simeth (Mylanta) 200-200-20 mg/5 mL oral suspension 10 mL, 10 mL, oral, 4x daily PRN, Garrison Flor MD, 10 mL at 07/06/24 0943    ARIPiprazole (Abilify) tablet 5 mg, 5 mg, oral, Nightly, Garrison Flor MD, 5 mg at 07/06/24 2023    eucerin cream 1 Application, 1 Application, Topical, q1h PRN, Medina Garcia, PharmD    hydrOXYzine HCL (Atarax) tablet 50 mg, 50 mg, oral, q6h PRN, Garrison Flor MD, 50 mg at 07/05/24 1300    ibuprofen tablet 600 mg, 600 mg, oral, q6h PRN, Garrison Flor MD, 600 mg at 07/05/24 2013    lamoTRIgine (LaMICtal) tablet 100 mg, 100 mg, oral, BID, Garrison Flor MD, 100 mg at 07/06/24 2023    levothyroxine (Synthroid, Levoxyl) tablet 75 mcg, 75 mcg, oral, Daily, Armen Rojas MD, 75 mcg at 07/07/24 0504    lithium ER (Eskalith) extended release tablet 450 mg, 450 mg, oral, BID, Garrison Flor MD, 450 mg at 07/06/24 2023    OLANZapine (ZyPREXA) injection 5 mg, 5 mg, intramuscular, q6h PRN, Garrison Flor MD    OLANZapine (ZyPREXA) tablet 5 mg, 5 mg, oral, q6h PRN, Garrison Flor MD    pantoprazole (ProtoNix) EC tablet 40 mg, 40 mg, oral, Daily before breakfast, Armen Rojas MD, 40 mg at 07/07/24 0626    propranolol (Inderal) tablet 20 mg, 20 mg, oral, Daily, Armen Rojas MD, 20 mg at 07/06/24 0906    traZODone (Desyrel) tablet 50 mg, 50 mg, oral, Nightly PRN, Garrison Flor MD, 50 mg at 07/05/24 2013    traZODone (Desyrel) tablet 50 mg, 50 mg, oral, Nightly, Garrison Flor MD, 50 mg at 07/06/24 2023       Garrison " JADE Flor MD

## 2024-07-07 NOTE — CARE PLAN
The patient's goals for the shift include sleep    The clinical goals for the shift include patient will sleep through the night    Over the shift, the patient did make progress toward the following goals. Patient was calm and cooperative throughout the shift. She was out of her room interacting with staff and peers. She was complaint with HS medication. She denies any SI, HI, hallucinations, depression or anxiety at this time. She is excited that she will get to discharge back to her group next week. She slept on and off through the night waking a couple of times to go to the bathroom. She woke up for the day around 4:45 am. Nursing will continue to monitor and encourage.

## 2024-07-08 PROCEDURE — 97150 GROUP THERAPEUTIC PROCEDURES: CPT | Mod: GO

## 2024-07-08 PROCEDURE — 99232 SBSQ HOSP IP/OBS MODERATE 35: CPT | Performed by: PSYCHIATRY & NEUROLOGY

## 2024-07-08 PROCEDURE — 2500000002 HC RX 250 W HCPCS SELF ADMINISTERED DRUGS (ALT 637 FOR MEDICARE OP, ALT 636 FOR OP/ED): Performed by: STUDENT IN AN ORGANIZED HEALTH CARE EDUCATION/TRAINING PROGRAM

## 2024-07-08 PROCEDURE — 1240000001 HC SEMI-PRIVATE BH ROOM DAILY

## 2024-07-08 PROCEDURE — 2500000001 HC RX 250 WO HCPCS SELF ADMINISTERED DRUGS (ALT 637 FOR MEDICARE OP)

## 2024-07-08 PROCEDURE — 2500000001 HC RX 250 WO HCPCS SELF ADMINISTERED DRUGS (ALT 637 FOR MEDICARE OP): Performed by: STUDENT IN AN ORGANIZED HEALTH CARE EDUCATION/TRAINING PROGRAM

## 2024-07-08 PROCEDURE — 2500000001 HC RX 250 WO HCPCS SELF ADMINISTERED DRUGS (ALT 637 FOR MEDICARE OP): Performed by: PSYCHIATRY & NEUROLOGY

## 2024-07-08 ASSESSMENT — PAIN SCALES - GENERAL
PAINLEVEL_OUTOF10: 3
PAINLEVEL_OUTOF10: 0 - NO PAIN

## 2024-07-08 ASSESSMENT — COLUMBIA-SUICIDE SEVERITY RATING SCALE - C-SSRS
1. SINCE LAST CONTACT, HAVE YOU WISHED YOU WERE DEAD OR WISHED YOU COULD GO TO SLEEP AND NOT WAKE UP?: NO
6. HAVE YOU EVER DONE ANYTHING, STARTED TO DO ANYTHING, OR PREPARED TO DO ANYTHING TO END YOUR LIFE?: NO
2. HAVE YOU ACTUALLY HAD ANY THOUGHTS OF KILLING YOURSELF?: NO
2. HAVE YOU ACTUALLY HAD ANY THOUGHTS OF KILLING YOURSELF?: NO
1. SINCE LAST CONTACT, HAVE YOU WISHED YOU WERE DEAD OR WISHED YOU COULD GO TO SLEEP AND NOT WAKE UP?: NO
6. HAVE YOU EVER DONE ANYTHING, STARTED TO DO ANYTHING, OR PREPARED TO DO ANYTHING TO END YOUR LIFE?: NO

## 2024-07-08 ASSESSMENT — PAIN DESCRIPTION - LOCATION: LOCATION: NECK

## 2024-07-08 ASSESSMENT — PAIN - FUNCTIONAL ASSESSMENT
PAIN_FUNCTIONAL_ASSESSMENT: 0-10
PAIN_FUNCTIONAL_ASSESSMENT: 0-10

## 2024-07-08 NOTE — PROGRESS NOTES
"Occupational Therapy     REHAB Therapy Assessment & Treatment    Patient Name: Gissel Greene  MRN: [id number]  Today's Date: 7/8/2024      Activity:  Stages of Change & Relapse Prevention Planning Group    Attendance:  Attendance  Activity: Discussion/reminisce  Participation: Active participation    Treatment Approach  Approach : Group therapy sessions  Patient Stated Goals: none stated  Cognition: Attention, Directions (Pt alert, attentive, oriented. Follows complex multi-step directions w/ mod. VCs, requiring additional verbal & visual cues & examples for full understanding.)  Social Skills: Interacts independently in social activity, Cooperates with others in group activity  Emotional: Mood (Pt mood euthymic, affect animated & appropriate)  Stress Management/Relaxation Training: Identifies benefits of stress management/relaxation techniques  Treatment Approach Comments: Pt attended & participated in morning therapy group focused on stages of change & relapse prevention planning. First, pt educated on the stages of change as they relate to destructive behaviors including substance abuse & self-harm. Educated pt on what each stage looks like in the context of daily occupations. Pt participated in discussion about change & recovery. Pt shared examples of  motivators for change, including friends. Next, pt educated on mental health maintenance after discharge using “The Hand” which emphasizes the importance of counseling, life skills, stress management, medication, & a balanced daily routine in maintaining mental wellness. Finally, pt guided through filling out “Relapse Prevention Plan” handout, which involves reflection on diagnoses & symptoms, identifying potential stressors after discharge, & planning to manage these stressors in a healthy way. Through completion of this handout, pt identified symptoms they experience w/ mental illness (\"stress & anxiety\"); stressors after discharge include friends & her " daughter; ways to reduce stress include music; personal positive qualities include ability to make new friends; support people include  & boyfriend; steps to prevent relapse including talking to roommates, relaxing, & going to hospital. Pt demonstrates fair understanding & motivation to apply skills.      Encounter Problems       Encounter Problems (Active)       OT Goals       Pt will explore, identify, and appropriately utilize effective coping strategies to cope with daily stressors and manage emotions with independence prior to discharge.  (Progressing)       Start:  07/04/24    Expected End:  08/01/24            Pt will demonstrate ability to appropriately modulate emotions and impulses with independence prior to discharge.  (Progressing)       Start:  07/04/24    Expected End:  08/01/24            Pt will explore, identify, and appropriately utilize effective communication strategies to express feelings, wants, and needs with independence prior to discharge.  (Progressing)       Start:  07/04/24    Expected End:  08/01/24                   Education:  Pt given educational handouts on stages of change & relapse prevention planning. Reviewed, discussed, & practiced. Pt demonstrates fair understanding.

## 2024-07-08 NOTE — CARE PLAN
The patient's goals for the shift include discharge tomorrow    The clinical goals for the shift include monitor patient, encourage hygiene, socialization.    Over the shift, the patient did not make progress toward the following goals. Barriers to progression include Pt. Has bonded with another patient that was discharged.  Overall tolerating well.   Recommendations to address these barriers include support patient, encourage socialization, good hygiene practices.

## 2024-07-08 NOTE — PROGRESS NOTES
"Occupational Therapy     REHAB Therapy Assessment & Treatment    Patient Name: Gissel Greene  MRN: [id number]  Today's Date: 7/8/2024      Activity:  Goal-Setting Group    Attendance:  Attendance  Activity: Discussion/reminisce  Participation: Active participation    Treatment Approach  Approach : Group therapy sessions  Patient Stated Goals: \"getting help & talking to someone, getting my own place\"  Cognition: Attention, Directions (Pt alert, oriented, & attentive. Follows complex multi-step directions w/ min. VCs progressing to independent.)  Social Skills: Demonstrates ability to listen to others, Interacts independently in social activity (Very friendly & interactive. Interrupts at times, demonstrating some impulsivity, but is redirectable.)  Emotional: Mood (Pt mood happy, upbeat; affect animated & appropriate)  Stress Management/Relaxation Training: Identifies benefits of stress management/relaxation techniques  Treatment Approach Comments: Pt attended & participated in afternoon therapy group focused on goal-setting. Pt educated on the importance of setting & reaching goals. Pt educated on characteristics of effective goals, including making goals realistic, relevant, specific, & time-bound. Pt used handouts provided to go through the steps of creating effective goals & plans to reach a long-term goal. First, pt identified broad goals in different areas of occupation, including living environment, working environment, & coping/dealing. Pt shared a goal w/ the group, stating she wanted to work toward having her her own place, get another job, & get better at talking to people as a coping mechanism. Next, pt narrowed down a few goals & made them more effective by adding specific objectives, time constraints, & reflecting on whether goals are realistic. Finally, pt selected 1 long-term goal, then developed a series of short-term goals toward completion of that long-term goal. Pt’s selected long-term goal " "was: \"Getting help & talking to someone\". Pt’s short-term goals in pursuit of this included: \"talking to counselor regularly\", \"reaching out to friends\", \"find a college to attend\", & \"work toward finding my own living environment\". Throughout process, pt required min. VCs near the beginning for increased understanding, then was more independent in remainder of steps of goal-setting. Pt also educated on strategies to promote healthy habit building, including environmental changes & having accountability partners. Pt demonstrates fair understanding & good motivation.      Encounter Problems       Encounter Problems (Active)       OT Goals       Pt will explore, identify, and appropriately utilize effective coping strategies to cope with daily stressors and manage emotions with independence prior to discharge.  (Progressing)       Start:  07/04/24    Expected End:  08/01/24            Pt will demonstrate ability to appropriately modulate emotions and impulses with independence prior to discharge.  (Progressing)       Start:  07/04/24    Expected End:  08/01/24            Pt will explore, identify, and appropriately utilize effective communication strategies to express feelings, wants, and needs with independence prior to discharge.  (Progressing)       Start:  07/04/24    Expected End:  08/01/24                 Education:  Pt given educational handouts on goal-setting & building healthy habits. Reviewed, discussed, & practiced. Pt demonstrates fair understanding.       "

## 2024-07-08 NOTE — CARE PLAN
The patient's goals for the shift include sleep    The clinical goals for the shift include Patient will sleep through the night    Over the shift, the patient did make progress toward the following goals. Patient was calm and cooperative throughout the shift. She was out of her room interacting with staff and peers. She took a shower and changed her clothes. She denies any SI, HI, hallucinations, depression or anxiety at this time. She is very excited to be discharging Monday or Tuesday. She was compliant with HS medications. She slept most of the night only waking a couple of times but went right back to sleep. Nursing will continue to monitor and encourage.    Statement Selected

## 2024-07-08 NOTE — CARE PLAN
Problem: OT Goals  Goal: Pt will explore, identify, and appropriately utilize effective coping strategies to cope with daily stressors and manage emotions with independence prior to discharge.   7/7/2024 2101 by Yeny Mattson OT  Outcome: Not Progressing  7/7/2024 1936 by Yeny Mattson OT  Outcome: Progressing  Goal: Pt will demonstrate ability to appropriately modulate emotions and impulses with independence prior to discharge.   7/7/2024 2101 by Yeny Mattson OT  Outcome: Not Progressing  7/7/2024 1936 by Yeny Mattson OT  Outcome: Progressing  Goal: Pt will explore, identify, and appropriately utilize effective communication strategies to express feelings, wants, and needs with independence prior to discharge.   7/7/2024 2101 by Yeny Mattson OT  Outcome: Not Progressing  7/7/2024 1936 by Yeny Mattson OT  Outcome: Progressing

## 2024-07-08 NOTE — PROGRESS NOTES
"Gissel Greene is a 33 y.o. female on day 4 of admission presenting with bipolar 1 disorder, currently depressed.    Subjective   Patient reports improved mood.  Has been sleeping well and reports a fair appetite.  Patient active on the unit socializing with staff and peers.  She enjoys listening to music in her room.  Patient tolerating medication with no adverse effects.  We will plan for discharge tomorrow.    Objective     MSE  General: Appropriately groomed and dressed.  Appearance: Appears stated age.  Attitude: Calm, cooperative.  Behavior: Appropriate eye contact.  Motor activity: No agitation or retardation. no EPS.  Normal gait.  Speech: Slurred speech secondary to developmental disability   Mood: \"Good \"  Affect: Appropriate range  Thought process: Organized, linear, goal-directed.  Associations are logical.  Thought content: Does not endorse suicidal or homicidal ideation, no delusions elicited.  Thought perception: Did not endorse auditory or visual hallucinations.  Cognition: Alert, oriented x3.  no deficit in memory or attention.  Insight: Fair.  Judgment: Fair.    Last Recorded Vitals  /56   Pulse 69   Temp 36.8 °C (98.2 °F)   Resp 16   Ht 1.549 m (5' 1\")   Wt 68.5 kg (151 lb)   SpO2 96%   BMI 28.53 kg/m²      Relevant Results  Scheduled medications  ARIPiprazole, 5 mg, oral, Nightly  lamoTRIgine, 100 mg, oral, BID  levothyroxine, 75 mcg, oral, Daily  lithium ER, 450 mg, oral, BID  pantoprazole, 40 mg, oral, Daily before breakfast  propranolol, 20 mg, oral, Daily  traZODone, 50 mg, oral, Nightly      Continuous medications     PRN medications  PRN medications: acetaminophen, alum-mag hydroxide-simeth, eucerin, hydrOXYzine HCL, ibuprofen, OLANZapine, OLANZapine, traZODone    No results found for this or any previous visit (from the past 24 hour(s)).     Assessment/Plan   Diagnosis:  Bipolar 1 disorder, currently depressed    Impression:     Labs and Chart: reviewed  Case discussed " with treatment team members  Encouraged patient to attend group and other mileu activity  Collateral from family - pending  Discharge planing  Medication:       - Reviewed. To continue as ordered    Medication Consent  Medication Consent: no medication changes necessary for review    EDILIA Steiner-CNP

## 2024-07-08 NOTE — PROGRESS NOTES
Occupational Therapy     REHAB Therapy Group Treatment (p.m)    Patient Name: Gissel Greene  MRN: [id number]  Today's Date: 7/7/2024  Time in 12:45  Time 13:30  45 min    Activity Assessment:   Pt demonstrated active participation during group activity to the best of her ability. No suicidal ideation was expressed this date.    OT Interventions group/1:1 : Therapeutic use of self/activities, OT Task Skills Group, OT Life Skills Management Skills, Grief Management/Anger Management ,  ADL/I ADL  , Positive Coping Skills/Stress Management, Community Re-Entry, Relaxation, Self Esteem, Communication Skills, Time Management Skills, Addiction education/prevention        Attendance:  Attendance  Activity:  (discussion - communication styles)  Participation: Active participation    Therapeutic Recreation:  Treatment Approach  Approach : 30 to 45 minutes  Patient Stated Goals: none stated  Cognition: Attention, Directions  Social Skills:  (cooperates with others)  Emotional: Mood, Behaviors  Stress Management/Relaxation Training: Identifies benefits of stress management/relaxation techniques  Treatment Approach Comments: Pt was educated in the benefits of assertive communcation as a means of decreasing frustration by assisting in needs being met. Pt demonstrated good self disclosure that she exhibits a aggressive communication stye with her mother. OT facilitator demonstrated role playing to model assertive vs other styles of communication.Pt appeared to demonstrate comprehension of all group material this date.      Encounter Problems       Encounter Problems (Active)       OT Goals       Pt will explore, identify, and appropriately utilize effective coping strategies to cope with daily stressors and manage emotions with independence prior to discharge.  (Not Progressing)       Start:  07/04/24    Expected End:  08/01/24            Pt will demonstrate ability to appropriately modulate emotions and impulses with  independence prior to discharge.  (Not Progressing)       Start:  07/04/24    Expected End:  08/01/24            Pt will explore, identify, and appropriately utilize effective communication strategies to express feelings, wants, and needs with independence prior to discharge.  (Not Progressing)       Start:  07/04/24    Expected End:  08/01/24                     Education Documentation  Handouts, taught by Yeny Mattson OT at 7/7/2024  7:36 PM.  Learner: Patient  Readiness: Acceptance  Method: Demonstration  Response: Demonstrated Understanding, Needs Reinforcement    Precautions, taught by Yeny Mattson OT at 7/7/2024  7:36 PM.  Learner: Patient  Readiness: Acceptance  Method: Demonstration  Response: Demonstrated Understanding, Needs Reinforcement    Education Comments  No comments found.          Additional Comments:

## 2024-07-09 VITALS
BODY MASS INDEX: 28.51 KG/M2 | DIASTOLIC BLOOD PRESSURE: 71 MMHG | SYSTOLIC BLOOD PRESSURE: 145 MMHG | OXYGEN SATURATION: 98 % | HEART RATE: 92 BPM | HEIGHT: 61 IN | TEMPERATURE: 97.7 F | RESPIRATION RATE: 16 BRPM | WEIGHT: 151 LBS

## 2024-07-09 LAB
HOLD SPECIMEN: NORMAL
HOLD SPECIMEN: NORMAL
LITHIUM SERPL-SCNC: 1.03 MMOL/L (ref 0.6–1.2)

## 2024-07-09 PROCEDURE — 2500000002 HC RX 250 W HCPCS SELF ADMINISTERED DRUGS (ALT 637 FOR MEDICARE OP, ALT 636 FOR OP/ED): Performed by: STUDENT IN AN ORGANIZED HEALTH CARE EDUCATION/TRAINING PROGRAM

## 2024-07-09 PROCEDURE — 97150 GROUP THERAPEUTIC PROCEDURES: CPT | Mod: GO

## 2024-07-09 PROCEDURE — 80178 ASSAY OF LITHIUM: CPT | Performed by: PSYCHIATRY & NEUROLOGY

## 2024-07-09 PROCEDURE — 99239 HOSP IP/OBS DSCHRG MGMT >30: CPT | Performed by: PSYCHIATRY & NEUROLOGY

## 2024-07-09 PROCEDURE — 2500000001 HC RX 250 WO HCPCS SELF ADMINISTERED DRUGS (ALT 637 FOR MEDICARE OP): Performed by: PSYCHIATRY & NEUROLOGY

## 2024-07-09 PROCEDURE — 2500000001 HC RX 250 WO HCPCS SELF ADMINISTERED DRUGS (ALT 637 FOR MEDICARE OP): Performed by: STUDENT IN AN ORGANIZED HEALTH CARE EDUCATION/TRAINING PROGRAM

## 2024-07-09 PROCEDURE — 36415 COLL VENOUS BLD VENIPUNCTURE: CPT | Performed by: PSYCHIATRY & NEUROLOGY

## 2024-07-09 RX ORDER — LAMOTRIGINE 100 MG/1
100 TABLET ORAL 2 TIMES DAILY
Qty: 60 TABLET | Refills: 0 | Status: SHIPPED | OUTPATIENT
Start: 2024-07-09 | End: 2024-08-08

## 2024-07-09 RX ORDER — LITHIUM CARBONATE 450 MG/1
450 TABLET ORAL 2 TIMES DAILY
Qty: 60 TABLET | Refills: 0 | Status: SHIPPED | OUTPATIENT
Start: 2024-07-09 | End: 2024-08-08

## 2024-07-09 RX ORDER — ARIPIPRAZOLE 5 MG/1
5 TABLET ORAL NIGHTLY
Qty: 30 TABLET | Refills: 0 | Status: SHIPPED | OUTPATIENT
Start: 2024-07-09 | End: 2024-08-08

## 2024-07-09 RX ORDER — TRAZODONE HYDROCHLORIDE 50 MG/1
50 TABLET ORAL NIGHTLY
Qty: 30 TABLET | Refills: 0 | Status: SHIPPED | OUTPATIENT
Start: 2024-07-09 | End: 2024-08-08

## 2024-07-09 ASSESSMENT — COLUMBIA-SUICIDE SEVERITY RATING SCALE - C-SSRS
1. SINCE LAST CONTACT, HAVE YOU WISHED YOU WERE DEAD OR WISHED YOU COULD GO TO SLEEP AND NOT WAKE UP?: NO
5. HAVE YOU STARTED TO WORK OUT OR WORKED OUT THE DETAILS OF HOW TO KILL YOURSELF? DO YOU INTEND TO CARRY OUT THIS PLAN?: NO
2. HAVE YOU ACTUALLY HAD ANY THOUGHTS OF KILLING YOURSELF?: NO
6. HAVE YOU EVER DONE ANYTHING, STARTED TO DO ANYTHING, OR PREPARED TO DO ANYTHING TO END YOUR LIFE?: NO

## 2024-07-09 ASSESSMENT — PAIN - FUNCTIONAL ASSESSMENT: PAIN_FUNCTIONAL_ASSESSMENT: 0-10

## 2024-07-09 ASSESSMENT — PAIN SCALES - GENERAL: PAINLEVEL_OUTOF10: 0 - NO PAIN

## 2024-07-09 NOTE — CARE PLAN
The patient's goals for the shift include Patient states goal is to go back to group home.    The clinical goals for the shift include Patient will  be active on therapeutic milieu.    Patient denies SI, HI, A/VH. Patient mood calm, affect congruent. Patient observed to be out on unit interacting with peers. Patient compliant with scheduled medications.       Problem: Sensory Perceptual Alteration as Evidenced by  Goal: Initiates reality-based interactions  Outcome: Progressing  Goal: Will not act on psychotic perception  Outcome: Progressing     Problem: Ineffective Coping  Goal: LTG - Verbalizes alternatives to suicide  Outcome: Progressing  Goal: STG - Verbalizes control of suicidal thoughts/behaviors  Outcome: Progressing     Problem: Fall/Injury  Goal: Verbalize understanding of risk factor reduction measures to prevent injury from fall in the home  Outcome: Progressing     Problem: IP Suicidal Ideation  Goal: STG: Gissel Greene will verbalize understanding of suicide precautions  Outcome: Progressing

## 2024-07-09 NOTE — PROGRESS NOTES
"Occupational Therapy     REHAB Therapy Assessment & Treatment    Patient Name: Gissel Greene  MRN: [id number]  Today's Date: 7/9/2024      Activity:  Positive Self-Talk Group    Attendance:  Attendance  Activity: Discussion/reminisce, Arts/crafts  Participation: Active participation    Treatment Approach  Approach : Group therapy sessions  Patient Stated Goals: none stated  Cognition: Attention, Directions (Pt alert, oriented, & attentive. Follows simple multi-step directions independently, occassionally requiring redirection back to task.)  Social Skills: Demonstrates ability to listen to others, Interacts independently in social activity (very upbeat, social)  Emotional: Mood (Pt mood euthymic, affect animated & appropriate)  Stress Management/Relaxation Training: Identifies benefits of stress management/relaxation techniques  Treatment Approach Comments: Pt attended & participated in afternoon therapy group focused on positive-self talk & creative expression. Pt educated on self-talk & how powerful it can be in different areas of life. Pt educated on identifying negative vs. positive self-talk. Pt participated in discussion of ways that negative vs. positive self-talk can influence outcomes in meaningful roles & occupations, mentioning social occupations. Pt participated in group practice of changing example statements from negative to positive self-talk, demonstrating emerging understanding. Next, pt given a list of positive self-talk statements/positive affirmations & asked to select at least 3 that they wanted to practice more in their own life. Pt selected statements including, \"I'm grateful for everything I have in my life\", \"I deserve to feel good about myself\", & \"I welcome love with open arms\". Finally, pt given materials for collage activity including paper, magazine cutouts, & glue sticks. Pt instructed to create a collage that reminds them of the positive self-talk statements they suggested. Pt " "participated, completing collage while engaging in conversation w/ peers & OT. Pt shared parts of her collage including the word \"LOVE\" & an image of a martínez, explained how this relates to her love of music & how music helps her cope. Pt demonstrates fair understanding & motivation to apply skills. Pt talked about looking forward to discharge home today.      Encounter Problems       Encounter Problems (Resolved)       OT Goals       Pt will explore, identify, and appropriately utilize effective coping strategies to cope with daily stressors and manage emotions with independence prior to discharge.  (Met)       Start:  07/04/24    Expected End:  08/01/24    Resolved:  07/09/24         Pt will demonstrate ability to appropriately modulate emotions and impulses with independence prior to discharge.  (Met)       Start:  07/04/24    Expected End:  08/01/24    Resolved:  07/09/24         Pt will explore, identify, and appropriately utilize effective communication strategies to express feelings, wants, and needs with independence prior to discharge.  (Met)       Start:  07/04/24    Expected End:  08/01/24    Resolved:  07/09/24              Education:  Pt given educational handout on positive self-talk. Reviewed & discussed. Pt demonstrates fair understanding.       "

## 2024-07-09 NOTE — CARE PLAN
Problem: Sensory Perceptual Alteration as Evidenced by  Goal: Initiates reality-based interactions  Outcome: Progressing  Goal: Will not act on psychotic perception  Outcome: Progressing     Problem: Ineffective Coping  Goal: LTG - Verbalizes alternatives to suicide  Outcome: Progressing  Goal: STG - Verbalizes control of suicidal thoughts/behaviors  Outcome: Progressing     Problem: Fall/Injury  Goal: Verbalize understanding of risk factor reduction measures to prevent injury from fall in the home  Outcome: Progressing     Problem: IP Suicidal Ideation  Goal: STG: Gissel Schmidtjefry will verbalize understanding of suicide precautions  Outcome: Progressing   The patient's goals for the shift include sleep    The clinical goals for the shift include sleep    Pt has been visible on the unit and ate a snack and listen to music in her room. Pt is child like and very difficult for writer to understand pt. Pt states she feels fine tonight and pt took her bed time meds. Pt is possible discharge tomorrow and will continue to monitor.

## 2024-07-09 NOTE — NURSING NOTE
Patient taken to emergency room entrance by T for transport back to group home. Patient given all belongings and discharge paperwork.

## 2024-07-12 LAB
ATRIAL RATE: 69 BPM
P AXIS: 17 DEGREES
P OFFSET: 207 MS
P ONSET: 169 MS
PR INTERVAL: 114 MS
Q ONSET: 226 MS
QRS COUNT: 12 BEATS
QRS DURATION: 72 MS
QT INTERVAL: 386 MS
QTC CALCULATION(BAZETT): 413 MS
QTC FREDERICIA: 404 MS
R AXIS: 36 DEGREES
T AXIS: 33 DEGREES
T OFFSET: 419 MS
VENTRICULAR RATE: 69 BPM

## 2024-09-03 ENCOUNTER — LAB REQUISITION (OUTPATIENT)
Dept: LAB | Facility: HOSPITAL | Age: 34
End: 2024-09-03
Payer: MEDICAID

## 2024-09-03 DIAGNOSIS — Z79.899 OTHER LONG TERM (CURRENT) DRUG THERAPY: ICD-10-CM

## 2024-09-03 LAB
ALBUMIN SERPL BCP-MCNC: 4.5 G/DL (ref 3.4–5)
ALP SERPL-CCNC: 73 U/L (ref 33–110)
ALT SERPL W P-5'-P-CCNC: 26 U/L (ref 7–45)
ANION GAP SERPL CALC-SCNC: 13 MMOL/L (ref 10–20)
AST SERPL W P-5'-P-CCNC: 25 U/L (ref 9–39)
BILIRUB SERPL-MCNC: 0.3 MG/DL (ref 0–1.2)
BUN SERPL-MCNC: 13 MG/DL (ref 6–23)
CALCIUM SERPL-MCNC: 10.1 MG/DL (ref 8.6–10.3)
CHLORIDE SERPL-SCNC: 104 MMOL/L (ref 98–107)
CO2 SERPL-SCNC: 25 MMOL/L (ref 21–32)
CREAT SERPL-MCNC: 0.82 MG/DL (ref 0.5–1.05)
EGFRCR SERPLBLD CKD-EPI 2021: >90 ML/MIN/1.73M*2
ERYTHROCYTE [DISTWIDTH] IN BLOOD BY AUTOMATED COUNT: 12.7 % (ref 11.5–14.5)
GLUCOSE SERPL-MCNC: 94 MG/DL (ref 74–99)
HCT VFR BLD AUTO: 39.4 % (ref 36–46)
HGB BLD-MCNC: 12.8 G/DL (ref 12–16)
LAMOTRIGINE SERPL-MCNC: 7.3 UG/ML (ref 2.5–15)
MCH RBC QN AUTO: 30.8 PG (ref 26–34)
MCHC RBC AUTO-ENTMCNC: 32.5 G/DL (ref 32–36)
MCV RBC AUTO: 95 FL (ref 80–100)
NRBC BLD-RTO: 0 /100 WBCS (ref 0–0)
PLATELET # BLD AUTO: 472 X10*3/UL (ref 150–450)
POTASSIUM SERPL-SCNC: 4 MMOL/L (ref 3.5–5.3)
PROT SERPL-MCNC: 7.4 G/DL (ref 6.4–8.2)
RBC # BLD AUTO: 4.15 X10*6/UL (ref 4–5.2)
SODIUM SERPL-SCNC: 138 MMOL/L (ref 136–145)
TSH SERPL-ACNC: 3.88 MIU/L (ref 0.44–3.98)
WBC # BLD AUTO: 13.1 X10*3/UL (ref 4.4–11.3)

## 2024-09-03 PROCEDURE — 80053 COMPREHEN METABOLIC PANEL: CPT | Mod: OUT | Performed by: PHYSICIAN ASSISTANT

## 2024-09-03 PROCEDURE — 80175 DRUG SCREEN QUAN LAMOTRIGINE: CPT | Mod: OUT,GENLAB | Performed by: PHYSICIAN ASSISTANT

## 2024-09-03 PROCEDURE — 85027 COMPLETE CBC AUTOMATED: CPT | Mod: OUT | Performed by: PHYSICIAN ASSISTANT

## 2024-09-03 PROCEDURE — 84443 ASSAY THYROID STIM HORMONE: CPT | Mod: OUT | Performed by: PHYSICIAN ASSISTANT

## 2024-12-03 ENCOUNTER — APPOINTMENT (OUTPATIENT)
Dept: CARDIOLOGY | Facility: HOSPITAL | Age: 34
End: 2024-12-03
Payer: MEDICAID

## 2024-12-03 ENCOUNTER — APPOINTMENT (OUTPATIENT)
Dept: RADIOLOGY | Facility: HOSPITAL | Age: 34
End: 2024-12-03
Payer: MEDICAID

## 2024-12-03 ENCOUNTER — HOSPITAL ENCOUNTER (EMERGENCY)
Facility: HOSPITAL | Age: 34
Discharge: HOME | End: 2024-12-03
Payer: MEDICAID

## 2024-12-03 VITALS
HEIGHT: 60 IN | DIASTOLIC BLOOD PRESSURE: 58 MMHG | WEIGHT: 145 LBS | RESPIRATION RATE: 18 BRPM | HEART RATE: 71 BPM | TEMPERATURE: 97.7 F | OXYGEN SATURATION: 99 % | BODY MASS INDEX: 28.47 KG/M2 | SYSTOLIC BLOOD PRESSURE: 102 MMHG

## 2024-12-03 DIAGNOSIS — R31.9 HEMATURIA, UNSPECIFIED TYPE: Primary | ICD-10-CM

## 2024-12-03 DIAGNOSIS — R10.9 FLANK PAIN: ICD-10-CM

## 2024-12-03 LAB
ALBUMIN SERPL BCP-MCNC: 4.9 G/DL (ref 3.4–5)
ALP SERPL-CCNC: 96 U/L (ref 33–110)
ALT SERPL W P-5'-P-CCNC: 31 U/L (ref 7–45)
AMORPH CRY #/AREA UR COMP ASSIST: ABNORMAL /HPF
ANION GAP SERPL CALC-SCNC: 13 MMOL/L (ref 10–20)
APPEARANCE UR: CLEAR
AST SERPL W P-5'-P-CCNC: 20 U/L (ref 9–39)
BACTERIA #/AREA URNS AUTO: ABNORMAL /HPF
BASOPHILS # BLD AUTO: 0.08 X10*3/UL (ref 0–0.1)
BASOPHILS NFR BLD AUTO: 0.5 %
BILIRUB SERPL-MCNC: 0.3 MG/DL (ref 0–1.2)
BILIRUB UR STRIP.AUTO-MCNC: NEGATIVE MG/DL
BNP SERPL-MCNC: 25 PG/ML (ref 0–99)
BUN SERPL-MCNC: 20 MG/DL (ref 6–23)
CALCIUM SERPL-MCNC: 9.6 MG/DL (ref 8.6–10.3)
CARDIAC TROPONIN I PNL SERPL HS: <3 NG/L (ref 0–13)
CHLORIDE SERPL-SCNC: 105 MMOL/L (ref 98–107)
CO2 SERPL-SCNC: 25 MMOL/L (ref 21–32)
COLOR UR: ABNORMAL
CREAT SERPL-MCNC: 0.87 MG/DL (ref 0.5–1.05)
EGFRCR SERPLBLD CKD-EPI 2021: 90 ML/MIN/1.73M*2
EOSINOPHIL # BLD AUTO: 0.33 X10*3/UL (ref 0–0.7)
EOSINOPHIL NFR BLD AUTO: 2.2 %
ERYTHROCYTE [DISTWIDTH] IN BLOOD BY AUTOMATED COUNT: 12.3 % (ref 11.5–14.5)
GLUCOSE SERPL-MCNC: 88 MG/DL (ref 74–99)
GLUCOSE UR STRIP.AUTO-MCNC: NORMAL MG/DL
HCT VFR BLD AUTO: 40.9 % (ref 36–46)
HGB BLD-MCNC: 13.4 G/DL (ref 12–16)
IMM GRANULOCYTES # BLD AUTO: 0.05 X10*3/UL (ref 0–0.7)
IMM GRANULOCYTES NFR BLD AUTO: 0.3 % (ref 0–0.9)
KETONES UR STRIP.AUTO-MCNC: NEGATIVE MG/DL
LACTATE SERPL-SCNC: 0.6 MMOL/L (ref 0.4–2)
LEUKOCYTE ESTERASE UR QL STRIP.AUTO: ABNORMAL
LYMPHOCYTES # BLD AUTO: 3.81 X10*3/UL (ref 1.2–4.8)
LYMPHOCYTES NFR BLD AUTO: 25.4 %
MCH RBC QN AUTO: 30.9 PG (ref 26–34)
MCHC RBC AUTO-ENTMCNC: 32.8 G/DL (ref 32–36)
MCV RBC AUTO: 95 FL (ref 80–100)
MONOCYTES # BLD AUTO: 0.76 X10*3/UL (ref 0.1–1)
MONOCYTES NFR BLD AUTO: 5.1 %
NEUTROPHILS # BLD AUTO: 9.95 X10*3/UL (ref 1.2–7.7)
NEUTROPHILS NFR BLD AUTO: 66.5 %
NITRITE UR QL STRIP.AUTO: NEGATIVE
NRBC BLD-RTO: 0 /100 WBCS (ref 0–0)
PH UR STRIP.AUTO: 6.5 [PH]
PLATELET # BLD AUTO: 412 X10*3/UL (ref 150–450)
POTASSIUM SERPL-SCNC: 3.5 MMOL/L (ref 3.5–5.3)
PROT SERPL-MCNC: 7.6 G/DL (ref 6.4–8.2)
PROT UR STRIP.AUTO-MCNC: NEGATIVE MG/DL
RBC # BLD AUTO: 4.33 X10*6/UL (ref 4–5.2)
RBC # UR STRIP.AUTO: ABNORMAL /UL
RBC #/AREA URNS AUTO: ABNORMAL /HPF
SODIUM SERPL-SCNC: 139 MMOL/L (ref 136–145)
SP GR UR STRIP.AUTO: 1.02
SQUAMOUS #/AREA URNS AUTO: ABNORMAL /HPF
UROBILINOGEN UR STRIP.AUTO-MCNC: NORMAL MG/DL
WBC # BLD AUTO: 15 X10*3/UL (ref 4.4–11.3)
WBC #/AREA URNS AUTO: ABNORMAL /HPF

## 2024-12-03 PROCEDURE — 74176 CT ABD & PELVIS W/O CONTRAST: CPT

## 2024-12-03 PROCEDURE — 87086 URINE CULTURE/COLONY COUNT: CPT | Mod: GENLAB | Performed by: PHYSICIAN ASSISTANT

## 2024-12-03 PROCEDURE — 36415 COLL VENOUS BLD VENIPUNCTURE: CPT | Performed by: PHYSICIAN ASSISTANT

## 2024-12-03 PROCEDURE — 74176 CT ABD & PELVIS W/O CONTRAST: CPT | Performed by: RADIOLOGY

## 2024-12-03 PROCEDURE — 84484 ASSAY OF TROPONIN QUANT: CPT | Performed by: PHYSICIAN ASSISTANT

## 2024-12-03 PROCEDURE — 85025 COMPLETE CBC W/AUTO DIFF WBC: CPT | Performed by: PHYSICIAN ASSISTANT

## 2024-12-03 PROCEDURE — 71046 X-RAY EXAM CHEST 2 VIEWS: CPT

## 2024-12-03 PROCEDURE — 81001 URINALYSIS AUTO W/SCOPE: CPT | Performed by: PHYSICIAN ASSISTANT

## 2024-12-03 PROCEDURE — 83605 ASSAY OF LACTIC ACID: CPT | Performed by: PHYSICIAN ASSISTANT

## 2024-12-03 PROCEDURE — 93005 ELECTROCARDIOGRAM TRACING: CPT

## 2024-12-03 PROCEDURE — 2500000004 HC RX 250 GENERAL PHARMACY W/ HCPCS (ALT 636 FOR OP/ED): Mod: SE

## 2024-12-03 PROCEDURE — 2500000002 HC RX 250 W HCPCS SELF ADMINISTERED DRUGS (ALT 637 FOR MEDICARE OP, ALT 636 FOR OP/ED): Mod: SE | Performed by: PHYSICIAN ASSISTANT

## 2024-12-03 PROCEDURE — 83880 ASSAY OF NATRIURETIC PEPTIDE: CPT | Performed by: PHYSICIAN ASSISTANT

## 2024-12-03 PROCEDURE — 80053 COMPREHEN METABOLIC PANEL: CPT | Performed by: PHYSICIAN ASSISTANT

## 2024-12-03 PROCEDURE — 99285 EMERGENCY DEPT VISIT HI MDM: CPT | Mod: 25

## 2024-12-03 PROCEDURE — 96374 THER/PROPH/DIAG INJ IV PUSH: CPT

## 2024-12-03 RX ORDER — TAMSULOSIN HYDROCHLORIDE 0.4 MG/1
0.4 CAPSULE ORAL DAILY
Status: DISCONTINUED | OUTPATIENT
Start: 2024-12-03 | End: 2024-12-03 | Stop reason: HOSPADM

## 2024-12-03 RX ORDER — KETOROLAC TROMETHAMINE 30 MG/ML
30 INJECTION, SOLUTION INTRAMUSCULAR; INTRAVENOUS ONCE
Status: COMPLETED | OUTPATIENT
Start: 2024-12-03 | End: 2024-12-03

## 2024-12-03 RX ORDER — KETOROLAC TROMETHAMINE 30 MG/ML
INJECTION, SOLUTION INTRAMUSCULAR; INTRAVENOUS
Status: COMPLETED
Start: 2024-12-03 | End: 2024-12-03

## 2024-12-03 ASSESSMENT — PAIN - FUNCTIONAL ASSESSMENT: PAIN_FUNCTIONAL_ASSESSMENT: 0-10

## 2024-12-03 ASSESSMENT — COLUMBIA-SUICIDE SEVERITY RATING SCALE - C-SSRS
6. HAVE YOU EVER DONE ANYTHING, STARTED TO DO ANYTHING, OR PREPARED TO DO ANYTHING TO END YOUR LIFE?: NO
1. IN THE PAST MONTH, HAVE YOU WISHED YOU WERE DEAD OR WISHED YOU COULD GO TO SLEEP AND NOT WAKE UP?: NO
2. HAVE YOU ACTUALLY HAD ANY THOUGHTS OF KILLING YOURSELF?: NO

## 2024-12-03 ASSESSMENT — PAIN DESCRIPTION - PAIN TYPE: TYPE: ACUTE PAIN

## 2024-12-03 ASSESSMENT — PAIN SCALES - GENERAL
PAINLEVEL_OUTOF10: 0 - NO PAIN
PAINLEVEL_OUTOF10: 10 - WORST POSSIBLE PAIN

## 2024-12-03 ASSESSMENT — PAIN DESCRIPTION - LOCATION: LOCATION: ABDOMEN

## 2024-12-03 ASSESSMENT — PAIN DESCRIPTION - ORIENTATION: ORIENTATION: LEFT;RIGHT

## 2024-12-03 NOTE — ED TRIAGE NOTES
Pt arrives ambulatory to [facility] presenting with abdominal , flank and groin pain. Per patient, pt diagnosed with kidney stone 1 week ago at , pain still present. Pt denies any CP, sob, V/D, fever and/or chills. Reports some nausea. Pt A&Ox3, resp easy and unlabored, skin of appropriate color.

## 2024-12-04 LAB
ATRIAL RATE: 67 BPM
HOLD SPECIMEN: NORMAL
P AXIS: 33 DEGREES
P OFFSET: 202 MS
P ONSET: 166 MS
PR INTERVAL: 118 MS
Q ONSET: 225 MS
QRS COUNT: 12 BEATS
QRS DURATION: 74 MS
QT INTERVAL: 392 MS
QTC CALCULATION(BAZETT): 414 MS
QTC FREDERICIA: 407 MS
R AXIS: 47 DEGREES
T AXIS: 60 DEGREES
T OFFSET: 421 MS
VENTRICULAR RATE: 67 BPM
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
## 2024-12-05 LAB — BACTERIA UR CULT: NORMAL

## 2024-12-07 NOTE — DISCHARGE SUMMARY
"Discharge Diagnosis:  Bipolar depression (Multi)    Hospital Course:  Patient is a 33-year-old female with a history of bipolar disorder, developmental disability, and ADHD who was admitted to [facility] for suicidal ideation. Due to acutely elevated and imminent risk for self-harm/harm to others, patient required a level of care equivalent to inpatient hospitalization for safety, evaluation, treatment and stabilization.     The patient was admitted to [facility] under the care of Dr. Flor, restricted to the plata and placed on suicide, behavior and elopement precautions.  At the beginning of hospitalization, patient  presented the ED with SI.  Patient was meeting with her therapist when she expressed that she had SI with a plan to \"to get a knife and sliced her arm \".  Patient cannot identify any recent stressors as to why she was feeling suicidal.  Patient currently lives in a group home.  Patient has limited support system.  Group home reports patient has been isolating more, eating alone, listening to impressive music.  Patient currently prescribed Risperdal, Lexapro, lithium, Lamictal for seizure history.         The treatment team made the following interventions: medication, group/milieu therapy, individual therapy    Over the course of hospitalization, patient reported improvement and objective signs of improvement were noted by staff.  Patient reported improved mood and sleep.  Patient was an active participant in group/individual therapy sessions on the unit.  Prior to discharge patient exhibited bright affect, she was social with staff and peers.  Patient future oriented looking forward to returning to her group home.  Requires hospitalization lithium optimized and increased to 450 mg oral twice daily, started on Abilify 5 mg oral daily for further mood stabilization.  Prior to discharge patient denied any paranoia, lesions, auditory and visual hallucinations and showed no " outward signs of psychosis.    Psychiatric Medications: Lithium  mg oral twice daily, Abilify 5 mg oral daily.  Patient tolerated medications without side effects.    Prior to the date of discharge, patient was able to contract for safety and stated they felt safe and appropriate for discharge.  The treatment team found the patient not to be an imminent danger to self or others.  The patient denied suicidal or homicidal ideation and did not endorse auditory and visual hallucinations.  The patient's condition at the time of discharge was stable and initial symptoms improved over the course of hospitalization.    The patient was discharged home under the supervision of family with a 30-day supply of  Lithium  mg oral twice daily, Abilify 5 mg oral daily.     The patient was instructed to call the patient's outpatient provider in the event of worsening symptoms or medication side effects.  Should the patient be unable to maintain their personal safety or the safety of others, instructions were provided to dial 9-1-1 or go to the closest emergency room.    Discharge Mental Status Exam:  General:  Patient is awake, alert, and oriented to person, place, time, and situation.    Appearance:  Appears well-hydrated, well-nourished, and well-groomed and approximately stated age.   Attitude:  Patient was calm and cooperative throughout the interview, which is appropriate to the context of the interview and the topics discussed.   Behavior:  Eye contact is appropriate with topics of discussion.   Motor Activity:  Motor activity is normal. No psychomotor disturbances or abnormal involuntary movements were noted, including psychomotor agitation, psychomotor retardation, involuntary movements, extrapyramidal symptoms, akathisia, or tardive dyskinesia. Gait is normal.   Speech:  Speech is spontaneous, coherent, fluent and of appropriate quantity, rate, volume, and tone and non-vulgar/vulgar.  Speech and mannerisms are  consistent with topics of discussion.   Affect:  Euthymic with a full range, mood congruent and appropriate to content.   Thought Process:  Thought process was linear, organized, and goal-directed and devoid of loose associations, flight of ideas, thought blocking or tangents.   Thought Content:  Thought content was devoid of suicidal ideation or intent, homicidal ideation or intent, self-harm ideation or intent, delusions, illusions, obsessions, or paranoia.   Thought Perception:  Did not endorse auditory or visual hallucinations. Patient did not appear to be internally distracted or preoccupied.   Cognition:  Knowledge and intelligence are believed to be average.  Recent and remote memory, fund of knowledge, and abstract reasoning appear grossly intact, appropriate for age and education, and there are no impairments in attention, concentration, or language.   Insight:  Insight regarding psychiatric conditions is good.   Judgment:  Judgment is good.    Recent Labs:  Results for orders placed or performed during the hospital encounter of 07/04/24 (from the past 24 hour(s))   Lavender Top   Result Value Ref Range    Extra Tube Hold for add-ons.    PST Top   Result Value Ref Range    Extra Tube Hold for add-ons.    Lithium   Result Value Ref Range    Lithium 1.03 0.60 - 1.20 mmol/L        Risk Assessment at Discharge:  Violence Risk Assessment: major mental illness  Acute Risk of Harm to Others is Considered: low   Risk Mitigated by: Adherence to treatment, strong therapeutic alliance. Follow-up.    Suicide Risk Assessment: , chronic medical illness, and current psychiatric illness  Protective Factors against Suicide: adherence to  treatment, hopefulness/future orientation, social support/connectedness, and strong therapeutic alliance with provider  Acute Risk of Harm to Self is Considered: low  Risk Mitigated by: Adherence to treatment, strong therapeutic alliance. Follow-up.      Bean Holm,  APRN-CNP    Current severe episode of major depressive disorder without psychotic features without prior episode

## 2024-12-13 LAB
ATRIAL RATE: 67 BPM
P AXIS: 33 DEGREES
P OFFSET: 202 MS
P ONSET: 166 MS
PR INTERVAL: 118 MS
Q ONSET: 225 MS
QRS COUNT: 12 BEATS
QRS DURATION: 74 MS
QT INTERVAL: 392 MS
QTC CALCULATION(BAZETT): 414 MS
QTC FREDERICIA: 407 MS
R AXIS: 47 DEGREES
T AXIS: 60 DEGREES
T OFFSET: 421 MS
VENTRICULAR RATE: 67 BPM

## 2024-12-16 PROCEDURE — 80178 ASSAY OF LITHIUM: CPT | Mod: OUT,GENLAB | Performed by: PHYSICIAN ASSISTANT

## 2025-01-03 ENCOUNTER — APPOINTMENT (OUTPATIENT)
Dept: UROLOGY | Facility: CLINIC | Age: 35
End: 2025-01-03
Payer: MEDICAID

## 2025-01-03 VITALS — TEMPERATURE: 97.3 F | HEIGHT: 60 IN | BODY MASS INDEX: 28.32 KG/M2

## 2025-01-03 DIAGNOSIS — R31.0 GROSS HEMATURIA: Primary | ICD-10-CM

## 2025-01-03 LAB
POC APPEARANCE, URINE: CLEAR
POC BILIRUBIN, URINE: NEGATIVE
POC BLOOD, URINE: ABNORMAL
POC COLOR, URINE: YELLOW
POC GLUCOSE, URINE: NEGATIVE MG/DL
POC KETONES, URINE: NEGATIVE MG/DL
POC LEUKOCYTES, URINE: ABNORMAL
POC NITRITE,URINE: NEGATIVE
POC PH, URINE: 7 PH
POC PROTEIN, URINE: ABNORMAL MG/DL
POC SPECIFIC GRAVITY, URINE: 1.02
POC UROBILINOGEN, URINE: 0.2 EU/DL

## 2025-01-03 PROCEDURE — 87086 URINE CULTURE/COLONY COUNT: CPT

## 2025-01-03 PROCEDURE — 99204 OFFICE O/P NEW MOD 45 MIN: CPT | Performed by: UROLOGY

## 2025-01-03 PROCEDURE — 88112 CYTOPATH CELL ENHANCE TECH: CPT | Performed by: PATHOLOGY

## 2025-01-03 PROCEDURE — 81003 URINALYSIS AUTO W/O SCOPE: CPT | Performed by: UROLOGY

## 2025-01-03 PROCEDURE — 1036F TOBACCO NON-USER: CPT | Performed by: UROLOGY

## 2025-01-03 PROCEDURE — 88112 CYTOPATH CELL ENHANCE TECH: CPT

## 2025-01-03 ASSESSMENT — PAIN SCALES - GENERAL: PAINLEVEL_OUTOF10: 7

## 2025-01-03 NOTE — PATIENT INSTRUCTIONS
Radiology [phone]  Take antibiotic 1 hour prior to cystoscopy.  We will need a urine sample during that appointment, so please arrive with a full enough bladder to leave a sample.  There are no restrictions in medications, eating/drinking, and driving.
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
## 2025-01-05 LAB — BACTERIA UR CULT: NORMAL

## 2025-01-06 DIAGNOSIS — Z79.2 NEED FOR PROPHYLACTIC ANTIBIOTIC: ICD-10-CM

## 2025-01-06 LAB
LABORATORY COMMENT REPORT: NORMAL
LABORATORY COMMENT REPORT: NORMAL
PATH REPORT.FINAL DX SPEC: NORMAL
PATH REPORT.GROSS SPEC: NORMAL
PATH REPORT.RELEVANT HX SPEC: NORMAL
PATH REPORT.TOTAL CANCER: NORMAL

## 2025-01-07 RX ORDER — CEPHALEXIN 500 MG/1
500 CAPSULE ORAL ONCE
Qty: 1 CAPSULE | Refills: 0 | Status: SHIPPED | OUTPATIENT
Start: 2025-01-07 | End: 2025-01-07

## 2025-01-08 DIAGNOSIS — R31.0 GROSS HEMATURIA: Primary | ICD-10-CM

## 2025-01-10 ENCOUNTER — HOSPITAL ENCOUNTER (OUTPATIENT)
Dept: RADIOLOGY | Facility: HOSPITAL | Age: 35
Discharge: HOME | End: 2025-01-10
Payer: MEDICAID

## 2025-01-10 DIAGNOSIS — R31.0 GROSS HEMATURIA: ICD-10-CM

## 2025-01-10 PROCEDURE — 2550000001 HC RX 255 CONTRASTS: Mod: SE | Performed by: UROLOGY

## 2025-01-10 PROCEDURE — A9575 INJ GADOTERATE MEGLUMI 0.1ML: HCPCS | Mod: SE | Performed by: UROLOGY

## 2025-01-10 PROCEDURE — 72197 MRI PELVIS W/O & W/DYE: CPT

## 2025-01-10 RX ORDER — SODIUM CHLORIDE 9 MG/ML
250 INJECTION, SOLUTION INTRAMUSCULAR; INTRAVENOUS; SUBCUTANEOUS EVERY 8 HOURS SCHEDULED
Status: DISCONTINUED | OUTPATIENT
Start: 2025-01-10 | End: 2025-01-11 | Stop reason: HOSPADM

## 2025-01-10 RX ORDER — GADOTERATE MEGLUMINE 376.9 MG/ML
13 INJECTION INTRAVENOUS
Status: COMPLETED | OUTPATIENT
Start: 2025-01-10 | End: 2025-01-10

## 2025-01-10 RX ADMIN — GADOTERATE MEGLUMINE 13 ML: 376.9 INJECTION INTRAVENOUS at 11:47

## 2025-01-22 ENCOUNTER — APPOINTMENT (OUTPATIENT)
Dept: UROLOGY | Facility: CLINIC | Age: 35
End: 2025-01-22
Payer: MEDICAID

## 2025-02-18 ENCOUNTER — APPOINTMENT (OUTPATIENT)
Dept: UROLOGY | Facility: CLINIC | Age: 35
End: 2025-02-18
Payer: MEDICAID

## 2025-02-18 VITALS
HEART RATE: 69 BPM | DIASTOLIC BLOOD PRESSURE: 58 MMHG | HEIGHT: 60 IN | BODY MASS INDEX: 27.84 KG/M2 | TEMPERATURE: 98 F | WEIGHT: 141.8 LBS | SYSTOLIC BLOOD PRESSURE: 106 MMHG

## 2025-02-18 DIAGNOSIS — R31.0 GROSS HEMATURIA: Primary | ICD-10-CM

## 2025-02-18 DIAGNOSIS — K80.20 GALLSTONES: ICD-10-CM

## 2025-02-18 LAB
POC APPEARANCE, URINE: CLEAR
POC BILIRUBIN, URINE: NEGATIVE
POC BLOOD, URINE: ABNORMAL
POC COLOR, URINE: ABNORMAL
POC GLUCOSE, URINE: NEGATIVE MG/DL
POC KETONES, URINE: NEGATIVE MG/DL
POC LEUKOCYTES, URINE: NEGATIVE
POC NITRITE,URINE: NEGATIVE
POC PH, URINE: 6 PH
POC PROTEIN, URINE: NEGATIVE MG/DL
POC SPECIFIC GRAVITY, URINE: <=1.005
POC UROBILINOGEN, URINE: 0.2 EU/DL

## 2025-02-18 PROCEDURE — 52000 CYSTOURETHROSCOPY: CPT | Performed by: UROLOGY

## 2025-02-18 PROCEDURE — 81003 URINALYSIS AUTO W/O SCOPE: CPT | Performed by: UROLOGY

## 2025-02-18 ASSESSMENT — PAIN SCALES - GENERAL: PAINLEVEL_OUTOF10: 0-NO PAIN
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
## 2025-03-03 ENCOUNTER — LAB REQUISITION (OUTPATIENT)
Dept: LAB | Facility: HOSPITAL | Age: 35
End: 2025-03-03
Payer: MEDICAID

## 2025-03-03 DIAGNOSIS — R41.82 ALTERED MENTAL STATUS, UNSPECIFIED: ICD-10-CM

## 2025-03-03 LAB
ALBUMIN SERPL BCP-MCNC: 4.7 G/DL (ref 3.4–5)
ALP SERPL-CCNC: 71 U/L (ref 33–110)
ALT SERPL W P-5'-P-CCNC: 18 U/L (ref 7–45)
ANION GAP SERPL CALC-SCNC: 13 MMOL/L (ref 10–20)
AST SERPL W P-5'-P-CCNC: 19 U/L (ref 9–39)
BILIRUB SERPL-MCNC: 0.4 MG/DL (ref 0–1.2)
BUN SERPL-MCNC: 17 MG/DL (ref 6–23)
CALCIUM SERPL-MCNC: 10 MG/DL (ref 8.6–10.3)
CHLORIDE SERPL-SCNC: 102 MMOL/L (ref 98–107)
CO2 SERPL-SCNC: 28 MMOL/L (ref 21–32)
CREAT SERPL-MCNC: 0.76 MG/DL (ref 0.5–1.05)
EGFRCR SERPLBLD CKD-EPI 2021: >90 ML/MIN/1.73M*2
ERYTHROCYTE [DISTWIDTH] IN BLOOD BY AUTOMATED COUNT: 12.9 % (ref 11.5–14.5)
GLUCOSE SERPL-MCNC: 86 MG/DL (ref 74–99)
HCT VFR BLD AUTO: 42.1 % (ref 36–46)
HGB BLD-MCNC: 13.4 G/DL (ref 12–16)
LAMOTRIGINE SERPL-MCNC: 3.4 UG/ML (ref 2.5–15)
LITHIUM SERPL-SCNC: 0.95 MMOL/L (ref 0.6–1.2)
MCH RBC QN AUTO: 30.4 PG (ref 26–34)
MCHC RBC AUTO-ENTMCNC: 31.8 G/DL (ref 32–36)
MCV RBC AUTO: 96 FL (ref 80–100)
NRBC BLD-RTO: 0 /100 WBCS (ref 0–0)
PLATELET # BLD AUTO: 468 X10*3/UL (ref 150–450)
POTASSIUM SERPL-SCNC: 3.8 MMOL/L (ref 3.5–5.3)
PROT SERPL-MCNC: 7.4 G/DL (ref 6.4–8.2)
RBC # BLD AUTO: 4.41 X10*6/UL (ref 4–5.2)
SODIUM SERPL-SCNC: 139 MMOL/L (ref 136–145)
TSH SERPL-ACNC: 6.07 MIU/L (ref 0.44–3.98)
WBC # BLD AUTO: 10.6 X10*3/UL (ref 4.4–11.3)

## 2025-03-03 PROCEDURE — 84443 ASSAY THYROID STIM HORMONE: CPT

## 2025-03-03 PROCEDURE — 80175 DRUG SCREEN QUAN LAMOTRIGINE: CPT

## 2025-03-03 PROCEDURE — 80053 COMPREHEN METABOLIC PANEL: CPT

## 2025-03-03 PROCEDURE — 85027 COMPLETE CBC AUTOMATED: CPT

## 2025-03-03 PROCEDURE — 80178 ASSAY OF LITHIUM: CPT

## 2025-03-07 ENCOUNTER — APPOINTMENT (OUTPATIENT)
Dept: SURGERY | Facility: CLINIC | Age: 35
End: 2025-03-07
Payer: MEDICAID

## 2025-06-09 ENCOUNTER — LAB REQUISITION (OUTPATIENT)
Dept: LAB | Facility: HOSPITAL | Age: 35
End: 2025-06-09
Payer: MEDICAID

## 2025-06-09 DIAGNOSIS — R41.82 ALTERED MENTAL STATUS, UNSPECIFIED: ICD-10-CM

## 2025-06-09 LAB — LITHIUM SERPL-SCNC: 1 MMOL/L (ref 0.6–1.2)

## 2025-06-09 PROCEDURE — 80178 ASSAY OF LITHIUM: CPT | Mod: OUT,GENLAB | Performed by: STUDENT IN AN ORGANIZED HEALTH CARE EDUCATION/TRAINING PROGRAM

## 2025-06-09 PROCEDURE — 36415 COLL VENOUS BLD VENIPUNCTURE: CPT | Mod: OUT | Performed by: STUDENT IN AN ORGANIZED HEALTH CARE EDUCATION/TRAINING PROGRAM

## 2026-02-18 ENCOUNTER — APPOINTMENT (OUTPATIENT)
Dept: UROLOGY | Facility: CLINIC | Age: 36
End: 2026-02-18
Payer: MEDICAID